# Patient Record
Sex: MALE | Race: WHITE | NOT HISPANIC OR LATINO
[De-identification: names, ages, dates, MRNs, and addresses within clinical notes are randomized per-mention and may not be internally consistent; named-entity substitution may affect disease eponyms.]

---

## 2019-06-11 ENCOUNTER — APPOINTMENT (OUTPATIENT)
Dept: INTERNAL MEDICINE | Facility: CLINIC | Age: 79
End: 2019-06-11
Payer: MEDICARE

## 2019-06-11 VITALS — RESPIRATION RATE: 14 BRPM | SYSTOLIC BLOOD PRESSURE: 100 MMHG | HEART RATE: 80 BPM | DIASTOLIC BLOOD PRESSURE: 54 MMHG

## 2019-06-11 VITALS — WEIGHT: 120 LBS

## 2019-06-11 DIAGNOSIS — E78.00 PURE HYPERCHOLESTEROLEMIA, UNSPECIFIED: ICD-10-CM

## 2019-06-11 DIAGNOSIS — N18.9 CHRONIC KIDNEY DISEASE, UNSPECIFIED: ICD-10-CM

## 2019-06-11 DIAGNOSIS — Z95.810 PRESENCE OF AUTOMATIC (IMPLANTABLE) CARDIAC DEFIBRILLATOR: ICD-10-CM

## 2019-06-11 DIAGNOSIS — K29.90 GASTRODUODENITIS, UNSPECIFIED, W/OUT BLEEDING: ICD-10-CM

## 2019-06-11 DIAGNOSIS — Z87.891 PERSONAL HISTORY OF NICOTINE DEPENDENCE: ICD-10-CM

## 2019-06-11 DIAGNOSIS — D63.1 CHRONIC KIDNEY DISEASE, UNSPECIFIED: ICD-10-CM

## 2019-06-11 PROCEDURE — 99204 OFFICE O/P NEW MOD 45 MIN: CPT | Mod: 25

## 2019-06-11 PROCEDURE — 36415 COLL VENOUS BLD VENIPUNCTURE: CPT

## 2019-06-11 RX ORDER — IPRATROPIUM BROMIDE AND ALBUTEROL SULFATE .5; 3 MG/3ML; MG/3ML
2.5-0.5 SOLUTION RESPIRATORY (INHALATION) 4 TIMES DAILY
Refills: 0 | Status: ACTIVE | COMMUNITY

## 2019-06-11 RX ORDER — METFORMIN HYDROCHLORIDE 850 MG/1
850 TABLET, FILM COATED ORAL TWICE DAILY
Refills: 0 | Status: ACTIVE | COMMUNITY

## 2019-06-11 RX ORDER — SACUBITRIL AND VALSARTAN 49; 51 MG/1; MG/1
49-51 TABLET, FILM COATED ORAL TWICE DAILY
Refills: 0 | Status: ACTIVE | COMMUNITY

## 2019-06-11 RX ORDER — TORSEMIDE 20 MG/1
20 TABLET ORAL TWICE DAILY
Qty: 180 | Refills: 3 | Status: ACTIVE | COMMUNITY

## 2019-06-11 RX ORDER — ADHESIVE TAPE 3"X 2.3 YD
50 MCG TAPE, NON-MEDICATED TOPICAL
Qty: 90 | Refills: 3 | Status: ACTIVE | COMMUNITY

## 2019-06-11 RX ORDER — RIVAROXABAN 15 MG/1
15 TABLET, FILM COATED ORAL
Qty: 90 | Refills: 3 | Status: ACTIVE | COMMUNITY

## 2019-06-11 RX ORDER — SPIRONOLACTONE 25 MG/1
25 TABLET ORAL DAILY
Qty: 90 | Refills: 3 | Status: ACTIVE | COMMUNITY

## 2019-06-11 RX ORDER — PANTOPRAZOLE 40 MG/1
40 TABLET, DELAYED RELEASE ORAL TWICE DAILY
Refills: 0 | Status: ACTIVE | COMMUNITY

## 2019-06-11 RX ORDER — ATORVASTATIN CALCIUM 10 MG/1
10 TABLET, FILM COATED ORAL DAILY
Qty: 90 | Refills: 3 | Status: ACTIVE | COMMUNITY

## 2019-06-11 RX ORDER — METOPROLOL SUCCINATE 25 MG/1
25 TABLET, EXTENDED RELEASE ORAL DAILY
Qty: 90 | Refills: 3 | Status: ACTIVE | COMMUNITY

## 2019-06-11 RX ORDER — FUROSEMIDE 20 MG/1
20 TABLET ORAL 3 TIMES DAILY
Refills: 0 | Status: DISCONTINUED | COMMUNITY
End: 2019-06-11

## 2019-06-11 RX ORDER — AMLODIPINE BESYLATE 5 MG/1
5 TABLET ORAL DAILY
Qty: 90 | Refills: 3 | Status: ACTIVE | COMMUNITY
Start: 2019-06-11

## 2019-06-11 NOTE — REVIEW OF SYSTEMS
[Shortness Of Breath] : shortness of breath [Dyspnea on Exertion] : dyspnea on exertion [FreeTextEntry5] : see HPi [FreeTextEntry2] : see HPI [de-identified] : Had agitation last night at [facility] house

## 2019-06-11 NOTE — ASSESSMENT
[FreeTextEntry1] : ANt wall MI 1981.  Stopped smoking at that time.  Has been in [facility] as  x 3 years. recently and deteriorating. Son droove him home from [facility].  Has O2 and AICD. Hosp x 2 for HCF and effusion and had L thoracentesis with 1 liter removed.  On both Lasix and Torsemide.  Meds clarified. DC Lasix.  Cont Torsemide 20 bid.  Does not need both.  Needs labs.  \par COPD.  On 2 liters.  Sat is 95.  Pt gets tired off O2.

## 2019-06-11 NOTE — PHYSICAL EXAM
[Cachectic] : cachexia was observed [Supple] : supple [Clear to Auscultation] : lungs were clear to auscultation bilaterally [Normal Supraclavicular Nodes] : no supraclavicular lymphadenopathy [Normal Posterior Cervical Nodes] : no posterior cervical lymphadenopathy [Normal Anterior Cervical Nodes] : no anterior cervical lymphadenopathy [Normal Affect] : the affect was normal [Alert and Oriented x3] : oriented to person, place, and time [de-identified] : Seems thin but comfortable on O2 [de-identified] : Irreg [de-identified] : Poor BS [de-identified] : 1-2+ edema at ankles

## 2019-06-11 NOTE — HISTORY OF PRESENT ILLNESS
[Other: _____] : [unfilled] [FreeTextEntry1] : Mr Plata was last seen 6 yrs ago.  He has been living in [facility].  Recently hospitalized and disch 6-6-19.\par Hosp x 2 4-19 and 6-19.  had Left Thoracentesis for pleural effusion 4-19.  Removed 1 liter.  Went to rehab 1 wk.  1 mo later, back to hosp.

## 2019-06-12 ENCOUNTER — RX RENEWAL (OUTPATIENT)
Age: 79
End: 2019-06-12

## 2019-06-12 LAB
ALBUMIN SERPL ELPH-MCNC: 4.3 G/DL
ALP BLD-CCNC: 87 U/L
ALT SERPL-CCNC: 9 U/L
ANION GAP SERPL CALC-SCNC: 16 MMOL/L
AST SERPL-CCNC: 12 U/L
BASOPHILS # BLD AUTO: 0.02 K/UL
BASOPHILS NFR BLD AUTO: 0.3 %
BILIRUB SERPL-MCNC: 0.7 MG/DL
BUN SERPL-MCNC: 22 MG/DL
CALCIUM SERPL-MCNC: 9.6 MG/DL
CHLORIDE SERPL-SCNC: 93 MMOL/L
CHOLEST SERPL-MCNC: 149 MG/DL
CHOLEST/HDLC SERPL: 3.6 RATIO
CO2 SERPL-SCNC: 26 MMOL/L
CREAT SERPL-MCNC: 1.04 MG/DL
EOSINOPHIL # BLD AUTO: 0.16 K/UL
EOSINOPHIL NFR BLD AUTO: 2 %
ESTIMATED AVERAGE GLUCOSE: 123 MG/DL
GLUCOSE SERPL-MCNC: 89 MG/DL
HBA1C MFR BLD HPLC: 5.9 %
HCT VFR BLD CALC: 37.8 %
HDLC SERPL-MCNC: 41 MG/DL
HGB BLD-MCNC: 11.3 G/DL
IMM GRANULOCYTES NFR BLD AUTO: 1.3 %
IRON SATN MFR SERPL: 14 %
IRON SERPL-MCNC: 49 UG/DL
LDLC SERPL CALC-MCNC: 90 MG/DL
LYMPHOCYTES # BLD AUTO: 1.13 K/UL
LYMPHOCYTES NFR BLD AUTO: 14.2 %
MAN DIFF?: NORMAL
MCHC RBC-ENTMCNC: 26.1 PG
MCHC RBC-ENTMCNC: 29.9 GM/DL
MCV RBC AUTO: 87.3 FL
MONOCYTES # BLD AUTO: 0.81 K/UL
MONOCYTES NFR BLD AUTO: 10.2 %
NEUTROPHILS # BLD AUTO: 5.74 K/UL
NEUTROPHILS NFR BLD AUTO: 72 %
PLATELET # BLD AUTO: 282 K/UL
POTASSIUM SERPL-SCNC: 4.6 MMOL/L
PROT SERPL-MCNC: 7.1 G/DL
RBC # BLD: 4.33 M/UL
RBC # FLD: 17.4 %
SODIUM SERPL-SCNC: 135 MMOL/L
TIBC SERPL-MCNC: 344 UG/DL
TRIGL SERPL-MCNC: 91 MG/DL
TSH SERPL-ACNC: 5.3 UIU/ML
UIBC SERPL-MCNC: 295 UG/DL
WBC # FLD AUTO: 7.96 K/UL

## 2019-06-12 RX ORDER — FERROUS SULFATE 325(65) MG
325 (65 FE) TABLET ORAL DAILY
Qty: 30 | Refills: 5 | Status: DISCONTINUED | COMMUNITY
End: 2019-06-12

## 2019-06-13 LAB — 25(OH)D3 SERPL-MCNC: 38.6 NG/ML

## 2019-06-21 ENCOUNTER — APPOINTMENT (OUTPATIENT)
Dept: INTERNAL MEDICINE | Facility: CLINIC | Age: 79
End: 2019-06-21

## 2019-06-21 ENCOUNTER — APPOINTMENT (OUTPATIENT)
Dept: INTERNAL MEDICINE | Facility: CLINIC | Age: 79
End: 2019-06-21
Payer: MEDICARE

## 2019-06-21 ENCOUNTER — INPATIENT (INPATIENT)
Facility: HOSPITAL | Age: 79
LOS: 3 days | Discharge: HOME | End: 2019-06-25
Attending: INTERNAL MEDICINE | Admitting: INTERNAL MEDICINE
Payer: MEDICARE

## 2019-06-21 VITALS
HEART RATE: 87 BPM | SYSTOLIC BLOOD PRESSURE: 147 MMHG | TEMPERATURE: 98 F | RESPIRATION RATE: 18 BRPM | WEIGHT: 128.09 LBS | HEIGHT: 63 IN | DIASTOLIC BLOOD PRESSURE: 67 MMHG | OXYGEN SATURATION: 99 %

## 2019-06-21 DIAGNOSIS — I11.9 HYPERTENSIVE HEART DISEASE W/OUT HEART FAILURE: ICD-10-CM

## 2019-06-21 DIAGNOSIS — F41.9 ANXIETY DISORDER, UNSPECIFIED: ICD-10-CM

## 2019-06-21 DIAGNOSIS — E11.9 TYPE 2 DIABETES MELLITUS WITHOUT COMPLICATIONS: ICD-10-CM

## 2019-06-21 DIAGNOSIS — J96.11 CHRONIC RESPIRATORY FAILURE WITH HYPOXIA: ICD-10-CM

## 2019-06-21 DIAGNOSIS — J44.9 CHRONIC OBSTRUCTIVE PULMONARY DISEASE, UNSPECIFIED: ICD-10-CM

## 2019-06-21 DIAGNOSIS — I50.23 ACUTE ON CHRONIC SYSTOLIC (CONGESTIVE) HEART FAILURE: ICD-10-CM

## 2019-06-21 DIAGNOSIS — E13.01 OTHER SPECIFIED DIABETES MELLITUS WITH HYPEROSMOLARITY WITH COMA: ICD-10-CM

## 2019-06-21 DIAGNOSIS — Z95.1 PRESENCE OF AORTOCORONARY BYPASS GRAFT: Chronic | ICD-10-CM

## 2019-06-21 DIAGNOSIS — Z95.0 PRESENCE OF CARDIAC PACEMAKER: Chronic | ICD-10-CM

## 2019-06-21 DIAGNOSIS — J96.11 CHRONIC RESPIRATORY FAILURE WITH HYPOXIA: Chronic | ICD-10-CM

## 2019-06-21 DIAGNOSIS — I10 ESSENTIAL (PRIMARY) HYPERTENSION: ICD-10-CM

## 2019-06-21 DIAGNOSIS — I48.2 CHRONIC ATRIAL FIBRILLATION: ICD-10-CM

## 2019-06-21 DIAGNOSIS — I25.810 ATHEROSCLEROSIS OF CORONARY ARTERY BYPASS GRAFT(S) WITHOUT ANGINA PECTORIS: ICD-10-CM

## 2019-06-21 DIAGNOSIS — Z29.9 ENCOUNTER FOR PROPHYLACTIC MEASURES, UNSPECIFIED: ICD-10-CM

## 2019-06-21 DIAGNOSIS — I50.42 CHRONIC COMBINED SYSTOLIC (CONGESTIVE) AND DIASTOLIC (CONGESTIVE) HEART FAILURE: ICD-10-CM

## 2019-06-21 LAB
ALBUMIN SERPL ELPH-MCNC: 3.6 G/DL — SIGNIFICANT CHANGE UP (ref 3.3–5)
ALP SERPL-CCNC: 110 U/L — SIGNIFICANT CHANGE UP (ref 40–120)
ALT FLD-CCNC: 16 U/L — SIGNIFICANT CHANGE UP (ref 12–78)
AMPHET UR-MCNC: NEGATIVE — SIGNIFICANT CHANGE UP
ANION GAP SERPL CALC-SCNC: 9 MMOL/L — SIGNIFICANT CHANGE UP (ref 5–17)
APAP SERPL-MCNC: < 2 UG/ML (ref 10–30)
AST SERPL-CCNC: 19 U/L — SIGNIFICANT CHANGE UP (ref 15–37)
BARBITURATES UR SCN-MCNC: NEGATIVE — SIGNIFICANT CHANGE UP
BASOPHILS # BLD AUTO: 0.05 K/UL — SIGNIFICANT CHANGE UP (ref 0–0.2)
BASOPHILS NFR BLD AUTO: 0.8 % — SIGNIFICANT CHANGE UP (ref 0–2)
BENZODIAZ UR-MCNC: NEGATIVE — SIGNIFICANT CHANGE UP
BILIRUB SERPL-MCNC: 1 MG/DL — SIGNIFICANT CHANGE UP (ref 0.2–1.2)
BUN SERPL-MCNC: 20 MG/DL — SIGNIFICANT CHANGE UP (ref 7–23)
CALCIUM SERPL-MCNC: 8.8 MG/DL — SIGNIFICANT CHANGE UP (ref 8.5–10.1)
CHLORIDE SERPL-SCNC: 102 MMOL/L — SIGNIFICANT CHANGE UP (ref 96–108)
CK MB BLD-MCNC: 3.4 % — SIGNIFICANT CHANGE UP (ref 0–3.5)
CK MB CFR SERPL CALC: 1 NG/ML — SIGNIFICANT CHANGE UP (ref 0.5–3.6)
CK SERPL-CCNC: 29 U/L — SIGNIFICANT CHANGE UP (ref 26–308)
CO2 SERPL-SCNC: 25 MMOL/L — SIGNIFICANT CHANGE UP (ref 22–31)
COCAINE METAB.OTHER UR-MCNC: NEGATIVE — SIGNIFICANT CHANGE UP
CREAT SERPL-MCNC: 1.18 MG/DL — SIGNIFICANT CHANGE UP (ref 0.5–1.3)
EOSINOPHIL # BLD AUTO: 0.2 K/UL — SIGNIFICANT CHANGE UP (ref 0–0.5)
EOSINOPHIL NFR BLD AUTO: 3.2 % — SIGNIFICANT CHANGE UP (ref 0–6)
ETHANOL SERPL-MCNC: <10 MG/DL — SIGNIFICANT CHANGE UP (ref 0–10)
GLUCOSE BLDC GLUCOMTR-MCNC: 228 MG/DL — HIGH (ref 70–99)
GLUCOSE SERPL-MCNC: 198 MG/DL — HIGH (ref 70–99)
HCT VFR BLD CALC: 39 % — SIGNIFICANT CHANGE UP (ref 39–50)
HGB BLD-MCNC: 12 G/DL — LOW (ref 13–17)
IMM GRANULOCYTES NFR BLD AUTO: 0.8 % — SIGNIFICANT CHANGE UP (ref 0–1.5)
LYMPHOCYTES # BLD AUTO: 0.85 K/UL — LOW (ref 1–3.3)
LYMPHOCYTES # BLD AUTO: 13.8 % — SIGNIFICANT CHANGE UP (ref 13–44)
MAGNESIUM SERPL-MCNC: 2.1 MG/DL — SIGNIFICANT CHANGE UP (ref 1.6–2.6)
MCHC RBC-ENTMCNC: 25.9 PG — LOW (ref 27–34)
MCHC RBC-ENTMCNC: 30.8 GM/DL — LOW (ref 32–36)
MCV RBC AUTO: 84.1 FL — SIGNIFICANT CHANGE UP (ref 80–100)
METHADONE UR-MCNC: NEGATIVE — SIGNIFICANT CHANGE UP
MONOCYTES # BLD AUTO: 0.59 K/UL — SIGNIFICANT CHANGE UP (ref 0–0.9)
MONOCYTES NFR BLD AUTO: 9.6 % — SIGNIFICANT CHANGE UP (ref 2–14)
NEUTROPHILS # BLD AUTO: 4.43 K/UL — SIGNIFICANT CHANGE UP (ref 1.8–7.4)
NEUTROPHILS NFR BLD AUTO: 71.8 % — SIGNIFICANT CHANGE UP (ref 43–77)
NRBC # BLD: 0 /100 WBCS — SIGNIFICANT CHANGE UP (ref 0–0)
NT-PROBNP SERPL-SCNC: 861 PG/ML — HIGH (ref 0–450)
OPIATES UR-MCNC: NEGATIVE — SIGNIFICANT CHANGE UP
PCP SPEC-MCNC: SIGNIFICANT CHANGE UP
PCP UR-MCNC: NEGATIVE — SIGNIFICANT CHANGE UP
PHOSPHATE SERPL-MCNC: 3.5 MG/DL — SIGNIFICANT CHANGE UP (ref 2.5–4.5)
PLATELET # BLD AUTO: 240 K/UL — SIGNIFICANT CHANGE UP (ref 150–400)
POTASSIUM SERPL-MCNC: 4.6 MMOL/L — SIGNIFICANT CHANGE UP (ref 3.5–5.3)
POTASSIUM SERPL-SCNC: 4.6 MMOL/L — SIGNIFICANT CHANGE UP (ref 3.5–5.3)
PROT SERPL-MCNC: 8 GM/DL — SIGNIFICANT CHANGE UP (ref 6–8.3)
RBC # BLD: 4.64 M/UL — SIGNIFICANT CHANGE UP (ref 4.2–5.8)
RBC # FLD: 17.2 % — HIGH (ref 10.3–14.5)
SALICYLATES SERPL-MCNC: <1.7 MG/DL — LOW (ref 2.8–20)
SODIUM SERPL-SCNC: 136 MMOL/L — SIGNIFICANT CHANGE UP (ref 135–145)
THC UR QL: NEGATIVE — SIGNIFICANT CHANGE UP
TROPONIN I SERPL-MCNC: 0.02 NG/ML — SIGNIFICANT CHANGE UP (ref 0.01–0.04)
WBC # BLD: 6.17 K/UL — SIGNIFICANT CHANGE UP (ref 3.8–10.5)
WBC # FLD AUTO: 6.17 K/UL — SIGNIFICANT CHANGE UP (ref 3.8–10.5)

## 2019-06-21 PROCEDURE — 99214 OFFICE O/P EST MOD 30 MIN: CPT

## 2019-06-21 PROCEDURE — 99223 1ST HOSP IP/OBS HIGH 75: CPT

## 2019-06-21 PROCEDURE — 93010 ELECTROCARDIOGRAM REPORT: CPT

## 2019-06-21 PROCEDURE — 71045 X-RAY EXAM CHEST 1 VIEW: CPT | Mod: 26

## 2019-06-21 PROCEDURE — 99285 EMERGENCY DEPT VISIT HI MDM: CPT

## 2019-06-21 RX ORDER — AMLODIPINE BESYLATE 2.5 MG/1
5 TABLET ORAL DAILY
Refills: 0 | Status: DISCONTINUED | OUTPATIENT
Start: 2019-06-21 | End: 2019-06-22

## 2019-06-21 RX ORDER — ATORVASTATIN CALCIUM 80 MG/1
1 TABLET, FILM COATED ORAL
Qty: 0 | Refills: 0 | DISCHARGE

## 2019-06-21 RX ORDER — FUROSEMIDE 40 MG
1 TABLET ORAL
Qty: 0 | Refills: 0 | DISCHARGE

## 2019-06-21 RX ORDER — DEXTROSE 50 % IN WATER 50 %
25 SYRINGE (ML) INTRAVENOUS ONCE
Refills: 0 | Status: DISCONTINUED | OUTPATIENT
Start: 2019-06-21 | End: 2019-06-25

## 2019-06-21 RX ORDER — DEXTROSE 50 % IN WATER 50 %
12.5 SYRINGE (ML) INTRAVENOUS ONCE
Refills: 0 | Status: DISCONTINUED | OUTPATIENT
Start: 2019-06-21 | End: 2019-06-25

## 2019-06-21 RX ORDER — FUROSEMIDE 40 MG
20 TABLET ORAL DAILY
Refills: 0 | Status: DISCONTINUED | OUTPATIENT
Start: 2019-06-21 | End: 2019-06-22

## 2019-06-21 RX ORDER — SACUBITRIL AND VALSARTAN 24; 26 MG/1; MG/1
1 TABLET, FILM COATED ORAL
Refills: 0 | Status: DISCONTINUED | OUTPATIENT
Start: 2019-06-21 | End: 2019-06-25

## 2019-06-21 RX ORDER — FUROSEMIDE 40 MG
40 TABLET ORAL ONCE
Refills: 0 | Status: COMPLETED | OUTPATIENT
Start: 2019-06-21 | End: 2019-06-21

## 2019-06-21 RX ORDER — ACETAMINOPHEN 500 MG
650 TABLET ORAL EVERY 6 HOURS
Refills: 0 | Status: DISCONTINUED | OUTPATIENT
Start: 2019-06-21 | End: 2019-06-25

## 2019-06-21 RX ORDER — PANTOPRAZOLE SODIUM 20 MG/1
1 TABLET, DELAYED RELEASE ORAL
Qty: 0 | Refills: 0 | DISCHARGE

## 2019-06-21 RX ORDER — ATORVASTATIN CALCIUM 80 MG/1
10 TABLET, FILM COATED ORAL DAILY
Refills: 0 | Status: DISCONTINUED | OUTPATIENT
Start: 2019-06-21 | End: 2019-06-21

## 2019-06-21 RX ORDER — SACUBITRIL AND VALSARTAN 24; 26 MG/1; MG/1
1 TABLET, FILM COATED ORAL
Qty: 0 | Refills: 0 | DISCHARGE

## 2019-06-21 RX ORDER — LANOLIN ALCOHOL/MO/W.PET/CERES
3 CREAM (GRAM) TOPICAL ONCE
Refills: 0 | Status: COMPLETED | OUTPATIENT
Start: 2019-06-21 | End: 2019-06-21

## 2019-06-21 RX ORDER — ATORVASTATIN CALCIUM 80 MG/1
10 TABLET, FILM COATED ORAL AT BEDTIME
Refills: 0 | Status: DISCONTINUED | OUTPATIENT
Start: 2019-06-21 | End: 2019-06-25

## 2019-06-21 RX ORDER — DEXTROSE 50 % IN WATER 50 %
15 SYRINGE (ML) INTRAVENOUS ONCE
Refills: 0 | Status: DISCONTINUED | OUTPATIENT
Start: 2019-06-21 | End: 2019-06-25

## 2019-06-21 RX ORDER — TICAGRELOR 90 MG/1
90 TABLET ORAL EVERY 12 HOURS
Refills: 0 | Status: DISCONTINUED | OUTPATIENT
Start: 2019-06-21 | End: 2019-06-25

## 2019-06-21 RX ORDER — PANTOPRAZOLE SODIUM 20 MG/1
40 TABLET, DELAYED RELEASE ORAL
Refills: 0 | Status: DISCONTINUED | OUTPATIENT
Start: 2019-06-21 | End: 2019-06-25

## 2019-06-21 RX ORDER — ACETAMINOPHEN 500 MG
650 TABLET ORAL ONCE
Refills: 0 | Status: COMPLETED | OUTPATIENT
Start: 2019-06-21 | End: 2019-06-21

## 2019-06-21 RX ORDER — RIVAROXABAN 15 MG-20MG
15 KIT ORAL
Refills: 0 | Status: DISCONTINUED | OUTPATIENT
Start: 2019-06-21 | End: 2019-06-25

## 2019-06-21 RX ORDER — ERGOCALCIFEROL 1.25 MG/1
1 CAPSULE ORAL
Qty: 0 | Refills: 0 | DISCHARGE

## 2019-06-21 RX ORDER — GLUCAGON INJECTION, SOLUTION 0.5 MG/.1ML
1 INJECTION, SOLUTION SUBCUTANEOUS ONCE
Refills: 0 | Status: DISCONTINUED | OUTPATIENT
Start: 2019-06-21 | End: 2019-06-25

## 2019-06-21 RX ORDER — METFORMIN HYDROCHLORIDE 850 MG/1
1 TABLET ORAL
Qty: 0 | Refills: 0 | DISCHARGE

## 2019-06-21 RX ORDER — METOPROLOL TARTRATE 50 MG
25 TABLET ORAL DAILY
Refills: 0 | Status: DISCONTINUED | OUTPATIENT
Start: 2019-06-21 | End: 2019-06-22

## 2019-06-21 RX ORDER — METFORMIN HYDROCHLORIDE 850 MG/1
500 TABLET ORAL
Refills: 0 | Status: DISCONTINUED | OUTPATIENT
Start: 2019-06-21 | End: 2019-06-25

## 2019-06-21 RX ORDER — ONDANSETRON 8 MG/1
4 TABLET, FILM COATED ORAL EVERY 6 HOURS
Refills: 0 | Status: DISCONTINUED | OUTPATIENT
Start: 2019-06-21 | End: 2019-06-25

## 2019-06-21 RX ORDER — TICAGRELOR 90 MG/1
1 TABLET ORAL
Qty: 0 | Refills: 0 | DISCHARGE

## 2019-06-21 RX ORDER — RIVAROXABAN 15 MG-20MG
1 KIT ORAL
Qty: 0 | Refills: 0 | DISCHARGE

## 2019-06-21 RX ORDER — HEPARIN SODIUM 5000 [USP'U]/ML
5000 INJECTION INTRAVENOUS; SUBCUTANEOUS EVERY 12 HOURS
Refills: 0 | Status: DISCONTINUED | OUTPATIENT
Start: 2019-06-21 | End: 2019-06-21

## 2019-06-21 RX ORDER — SODIUM CHLORIDE 9 MG/ML
1000 INJECTION, SOLUTION INTRAVENOUS
Refills: 0 | Status: DISCONTINUED | OUTPATIENT
Start: 2019-06-21 | End: 2019-06-25

## 2019-06-21 RX ADMIN — Medication 20 MILLIGRAM(S): at 18:07

## 2019-06-21 RX ADMIN — Medication 650 MILLIGRAM(S): at 11:41

## 2019-06-21 RX ADMIN — Medication 25 MILLIGRAM(S): at 18:07

## 2019-06-21 RX ADMIN — RIVAROXABAN 15 MILLIGRAM(S): KIT at 18:07

## 2019-06-21 RX ADMIN — TICAGRELOR 90 MILLIGRAM(S): 90 TABLET ORAL at 18:07

## 2019-06-21 RX ADMIN — SACUBITRIL AND VALSARTAN 1 TABLET(S): 24; 26 TABLET, FILM COATED ORAL at 18:07

## 2019-06-21 RX ADMIN — Medication 3 MILLIGRAM(S): at 22:45

## 2019-06-21 RX ADMIN — METFORMIN HYDROCHLORIDE 500 MILLIGRAM(S): 850 TABLET ORAL at 18:07

## 2019-06-21 RX ADMIN — Medication 40 MILLIGRAM(S): at 11:36

## 2019-06-21 RX ADMIN — ATORVASTATIN CALCIUM 10 MILLIGRAM(S): 80 TABLET, FILM COATED ORAL at 21:55

## 2019-06-21 NOTE — H&P ADULT - NSHPREVIEWOFSYSTEMS_GEN_ALL_CORE
REVIEW OF SYSTEMS:    CONSTITUTIONAL: No fever, NO generalized weakness/Fatigue, No weight loss  EYES: No eye pain, visual disturbances, or discharge  ENMT:  No difficulty hearing, tinnitus, vertigo; No sinus or throat pain  NECK: No pain or stiffness  RESPIRATORY: positive SALEH, no cough, wheezing, sputum or hemoptysis   CARDIOVASCULAR: positive leg swelling, No chest pain, palpitations  GASTROINTESTINAL: No abdominal pain. No nausea, vomiting, diarrhea or constipation. No melena or hematochezia.  GENITOURINARY: No dysuria, frequency, hematuria, or incontinence  SKIN: No itching, burning, rashes, or lesions   MUSCULOSKELETAL: No joint pain or swelling; No muscle, back, or extremity pain  HEME/LYMPH: No easy bruising, or bleeding gums  NEUROLOGICAL: No headaches, Dizziness, memory loss, loss of strength, numbness, or tremors  PSYCHIATRIC: No depression, anxiety, mood swings, or difficulty sleeping

## 2019-06-21 NOTE — ED PROVIDER NOTE - CLINICAL SUMMARY MEDICAL DECISION MAKING FREE TEXT BOX
pt with mild to moderate respiratory distress secondary to CHF likely on xray noted pleural edema bilaterally - will give lasix in ED - admit for further cardiac care.

## 2019-06-21 NOTE — ED PROVIDER NOTE - CONSTITUTIONAL, MLM
normal... Well appearing, well nourished, awake, alert, oriented to person, place, time/situation and mild respiratory distress noted RR 30

## 2019-06-21 NOTE — H&P ADULT - PROBLEM SELECTOR PLAN 1
Acute on Chronic Respiratory failure, acute exacerbation resolved w/ diuresis  secondary to acute CHF

## 2019-06-21 NOTE — HISTORY OF PRESENT ILLNESS
[FreeTextEntry1] : Patient came peacefully and became agitated when he learned he was late for his 8:30 appt and had to wait a few minutes while the 9 AM patient was being seen in his slot.  \par Pt lowered himself to the floor without any head injury.  Behavior is inappropriate.  EMS called.  \par No LOC noted, only rapid breathing and agitation.  No focal weakness or change in speech. Calmer once I was able to attend to him and more calm once EMS arrived.  \par Patient remained with eyes closed during 4 technician EMS eval.

## 2019-06-21 NOTE — H&P ADULT - NSHPPHYSICALEXAM_GEN_ALL_CORE
GENERAL: NAD, Thin, on Nasal canula    HEAD:  Atraumatic, Normocephalic  EYES: conjunctiva and sclera clear  ENMT: Moist mucous membranes, Good dentition, No lesions  NECK: Supple, No JVD, Normal thyroid  NERVOUS SYSTEM:  Alert & Oriented X3, Good concentration; Motor Strength 5/5 B/L upper and lower extremities; DTRs 2+ intact and symmetric  CHEST/LUNG: Clear to ascultation bilaterally; No rales, rhonchi, wheezing, or rubs  HEART: Regular rate and rhythm; No murmurs, rubs, or gallops  ABDOMEN: Soft, Nontender, Nondistended; Bowel sounds present  EXTREMITIES:  2+ Peripheral Pulses, No clubbing, cyanosis, or edema  LYMPH: No lymphadenopathy   SKIN: No rashes or lesions

## 2019-06-21 NOTE — H&P ADULT - NSHPLABSRESULTS_GEN_ALL_CORE
12.0   6.17  )-----------( 240      ( 21 Jun 2019 10:12 )             39.0     06-21    136  |  102  |  20  ----------------------------<  198<H>  4.6   |  25  |  1.18    Ca    8.8      21 Jun 2019 10:12  Phos  3.5     06-21  Mg     2.1     06-21    TPro  8.0  /  Alb  3.6  /  TBili  1.0  /  DBili  x   /  AST  19  /  ALT  16  /  AlkPhos  110  06-21    Xray Chest 1 View-PORTABLE IMMEDIATE (06.21.19 @ 10:49) >    FINDINGS/  IMPRESSION:There are bilateral diffuse patchy airspace opacities which   may represent pulmonary edema or multifocal pneumonia. The patient is   status poststernotomy and CABG. There is a dual-lead left-sided cardiac   pacing device in place.    EKG: SR @ 85 bpm, ?Bigeminy

## 2019-06-21 NOTE — ED ADULT TRIAGE NOTE - CHIEF COMPLAINT QUOTE
as per ems " pt from cardiologist office and complaining of sob, anxiety, and nausea." hx 3 bypass, dm, afib, chf. Pt educated on COPD action plan, teach back method utilized. Written materials  and action plan provided.

## 2019-06-21 NOTE — H&P ADULT - PROBLEM SELECTOR PLAN 2
CXR: bilateral congestion, BNP: 861, LE edema POA   Pt appears Euvolemic after ER IV Lasix 40  will continue with home dose for now  f/u Cardiology consult   f/u Echocardiogram

## 2019-06-21 NOTE — ASSESSMENT
[FreeTextEntry1] : Acute sudden agitation upon arrival and removed by EMS.\par No PE done.  \par Aware off O2 and now on Brilinta and Eliquis per 2 day stay at [facility] where he required Risperdal x 1 dose. \par Cecy daughter aware that this is primarily a psychiatric problem and she hopes that in the hospital he will have a psych MD eval and treatment.  Going now to [facility].

## 2019-06-21 NOTE — ED ADULT NURSE NOTE - OBJECTIVE STATEMENT
received pt to bed 10 alert anxious complaining of nausea and shortness of breath. sent from cardiology office to be evaluated

## 2019-06-21 NOTE — H&P ADULT - HISTORY OF PRESENT ILLNESS
79 y/o M with PMH of HTN, HLD, DM II, CAD s/p CABG, CHF s/p AICD, and Chronic respiratory Failure (home 2L O2) presents to ER with Shortness of breath and Anxiety that started yesterday. The patient is visiting from [place] and states that he has been hospitalized several time in the past 4 months for this Shortness of breath including an ICU course where he was intubated. He describes Dyspnea with mild activity and swelling in his legs when he woke up this morning. He admits to feeling nausea, feeling anxious, denies CP, palpitations or dizziness. He denies fever, cough, vomiting, abdominal pain, diarrhea or dysuria.       ER course: CXR: bilateral opacities, BNP: 861, trop: .020, EKG: SR@85bpm, ?bigeminy, s/p IV Lasix 40mg

## 2019-06-21 NOTE — CONSULT NOTE ADULT - SUBJECTIVE AND OBJECTIVE BOX
CHIEF COMPLAINT:  Patient is a 78y old  Male who presents with a chief complaint of Shortness of breath, Anxiety (21 Jun 2019 14:45)      HPI:  77 y/o M with HTN, HLD, DM II, CAD s/p CABG, CHF s/p AICD, and Chronic respiratory Failure (home 2L O2) presents to ER with Shortness of breath and Anxiety that started yesterday. The patient is visiting from [place] and states that he has been hospitalized several time in the past 4 months for this Shortness of breath including an ICU course where he was intubated. He describes Dyspnea with mild activity and swelling in his legs when he woke up this morning. He admits to feeling nausea, feeling anxious, denies CP, palpitations or dizziness. He denies fever, cough, vomiting, abdominal pain, diarrhea or dysuria.   CXR: bilateral opacities, BNP: 861, trop: .020, EKG: SR@85bpm, ?bigeminy, s/p IV Lasix 40mg. Feeling better.    ALLERGIES:  No Known Allergies    Home Medications:  amLODIPine 5 mg oral tablet: 1 tab(s) orally once a day (21 Jun 2019 16:28)  atorvastatin 10 mg oral tablet: 1 tab(s) orally once a day (21 Jun 2019 16:28)  Entresto 49 mg-51 mg oral tablet: 1 tab(s) orally 2 times a day (21 Jun 2019 16:28)  furosemide 20 mg oral tablet: 1 tab(s) orally once a day (21 Jun 2019 16:28)  metFORMIN 500 mg oral tablet: 1 tab(s) orally 2 times a day (21 Jun 2019 16:28)  metoprolol succinate 25 mg oral tablet, extended release: 1 tab(s) orally once a day (21 Jun 2019 16:28)  Protonix 40 mg oral delayed release tablet: 1 tab(s) orally once a day (21 Jun 2019 16:28)  rivaroxaban 15 mg oral tablet: 1 tab(s) orally once a day (in the evening) (21 Jun 2019 16:28)  ticagrelor 90 mg oral tablet: 1 tab(s) orally 2 times a day (21 Jun 2019 16:28)  Vitamin D2 2000 intl units oral capsule: 1 cap(s) orally once a day (21 Jun 2019 16:28)      PAST MEDICAL & SURGICAL HISTORY:  Hyperlipidemia  Chronic hypoxemic respiratory failure  CAD (coronary atherosclerotic disease)  HTN (hypertension)  CHF (congestive heart failure)  Diabetes  S/P CABG x 3  Artificial pacemaker      FAMILY HISTORY:  FH: heart disease      SOCIAL HISTORY:  non-smoker    REVIEW OF SYSTEMS:  General:  No wt loss, fevers, chills, night sweats  Eyes:  Good vision, no reported pain  ENT:  No sore throat, pain, runny nose, dysphagia  CV:  No pain, palpitations, hypo/hypertension  Resp:  No dyspnea, cough, tachypnea, wheezing  GI:  No pain, nausea, vomiting, diarrhea, constipation  :  No pain, bleeding, incontinence, nocturia  Muscle:  No pain, weakness  Breast:  No pain, abscess, mass, discharge  Neuro:  No weakness, tingling, memory problems  Psych:  No fatigue, insomnia, mood problems, depression  Endocrine:  No polyuria, polydipsia, cold/heat intolerance  Heme:  No petechiae, ecchymosis, easy bruisability  Skin:  No rash, edema    PHYSICAL EXAM:  Vital Signs:  Vital Signs Last 24 Hrs  T(C): 37 (21 Jun 2019 15:56), Max: 37 (21 Jun 2019 15:56)  T(F): 98.6 (21 Jun 2019 15:56), Max: 98.6 (21 Jun 2019 15:56)  HR: 86 (21 Jun 2019 15:56) (81 - 88)  BP: 124/68 (21 Jun 2019 15:56) (113/68 - 147/67)  RR: 18 (21 Jun 2019 15:56) (18 - 32)  SpO2: 98% (21 Jun 2019 15:56) (97% - 100%)  I&O's Summary      General:  Appears stated age, well-groomed, well-nourished, no distress  HEENT:  NC/AT, patent nares w/ pink mucosa, OP clear w/o lesions, PERRL, EOMI, conjunctivae clear, no thyromegaly, nodules, adenopathy, no JVD  Chest:  Full & symmetric excursion, no increased effort, breath sounds clear  Cardiovascular:  Regular rhythm, S1, S2, no murmur  Abdomen:  Soft, non-tender, non-distended, normoactive bowel sounds  Extremities:  no edema  Skin:  No rash/erythema/ecchymoses/petechiae/wounds/abscess/warm/dry  Musculoskeletal:  Full ROM in all joints w/o swelling/tenderness/effusion  Neuro/Psych:  Alert, oriented    LABORATORY:                          12.0   6.17  )-----------( 240      ( 21 Jun 2019 10:12 )             39.0     06-21    136  |  102  |  20  ----------------------------<  198<H>  4.6   |  25  |  1.18    Ca    8.8      21 Jun 2019 10:12  Phos  3.5     06-21  Mg     2.1     06-21    TPro  8.0  /  Alb  3.6  /  TBili  1.0  /  DBili  x   /  AST  19  /  ALT  16  /  AlkPhos  110  06-21      CARDIAC MARKERS ( 21 Jun 2019 10:12 )  .020 ng/mL / x     / 29 U/L / x     / 1.0 ng/mL      LIVER FUNCTIONS - ( 21 Jun 2019 10:12 )  Alb: 3.6 g/dL / Pro: 8.0 gm/dL / ALK PHOS: 110 U/L / ALT: 16 U/L / AST: 19 U/L / GGT: x               BNPSerum Pro-Brain Natriuretic Peptide: 861 pg/mL (06-21-19 @ 10:12)      IMAGING:  < from: 12 Lead ECG (06.21.19 @ 10:23) >  Normal sinus rhythm  premature ectopic complexes  Nonspecific T wave abnormality  Abnormal ECG  No previous ECGs available    < end of copied text >    < from: Xray Chest 1 View-PORTABLE IMMEDIATE (06.21.19 @ 10:49) >  IMPRESSION:There are bilateral diffuse patchy airspace opacities which   may represent pulmonary edema or multifocal pneumonia. The patient is   status poststernotomy and CABG. There is a dual-lead left-sided cardiac   pacing device in place.    < end of copied text >    ASSESSMENT:  77 y/o M with HTN, HLD, DM II, CAD s/p CABG, CHF s/p AICD, and Chronic respiratory Failure (home 2L O2) presents to ER with Shortness of breath and Anxiety that started yesterday. The patient is visiting from [place] and states that he has been hospitalized several time in the past 4 months for this Shortness of breath including an ICU course where he was intubated. He describes Dyspnea with mild activity and swelling in his legs when he woke up this morning. He admits to feeling nausea, feeling anxious, denies CP, palpitations or dizziness. He denies fever, cough, vomiting, abdominal pain, diarrhea or dysuria.   CXR: bilateral opacities, BNP: 861, trop: .020, EKG: SR@85bpm, ?bigeminy, s/p IV Lasix 40mg. Feeling better.    PLAN:     MEDICATIONS  (STANDING):  amLODIPine   Tablet 5 milliGRAM(s) Oral daily  atorvastatin 10 milliGRAM(s) Oral at bedtime  furosemide    Tablet 20 milliGRAM(s) Oral daily  metFORMIN 500 milliGRAM(s) Oral two times a day  metoprolol succinate ER 25 milliGRAM(s) Oral daily  pantoprazole    Tablet 40 milliGRAM(s) Oral before breakfast  rivaroxaban 15 milliGRAM(s) Oral with dinner  sacubitril 49 mG/valsartan 51 mG 1 Tablet(s) Oral two times a day  ticagrelor 90 milliGRAM(s) Oral every 12 hours    fluid restrict. daily wt  echo pending.    Malcolm Ewing MD, FACC, FASE, RAHATNC, FACP  Director, Heart Failure Services  [facility]  , Department of Cardiology  [facility]

## 2019-06-21 NOTE — ED PROVIDER NOTE - OBJECTIVE STATEMENT
78 year old male with PMH of CAD with CABG x 3 V, DM II, HLD, HTN, otherwise presenting to ED due to SOB from PMD office - pt also has been acting up at home as per daughter which has been more frequent since March of this year when he was in ICU in [place]. Pt otherwise complaining of SOB, no chest pain.

## 2019-06-21 NOTE — ED ADULT NURSE NOTE - NSFALLRSKOUTCOME_ED_ALL_ED
"Subjective:   Deo Beard is a 52 y.o. male with know h/o  1. CAD-nuclear stress test from 2014 showed Mixed findings of fixed decreased perfusion and a reversible stress induced ischemia seen in inferolateral distribution.  Never had angiogram  2. Ischemic cardiomyopathy  3. History of morbid obesity status post gastric bypass surgery lost approximately 150 lb  4. Sleep apnea on BiPAP  5. Hypertension  6. Dyslipidemia  Presenting today for follow-up evaluation of CAD.    Patient on an average walks about one and half miles a day. He does have stable angina for past 2 years. Denied any complaints of palpitations orthopnea or PND. No complaints of dizziness lightheadedness or LOC. Denied any complaints of lower extremity edema or claudication.    Past Medical History:   Diagnosis Date   • Abnormal EKG 12/27/2012   • Abnormal EKG 12/27/2012   • Bronchitis    • CAD (coronary artery disease) 2008   • Depression    • Diabetes ([facility])    • HTN (hypertension)    • Hyperlipidemia    • Hypoxia 12/27/2012   • Ischemic cardiomyopathy    • Leukocytosis (leucocytosis) 12/27/2012   • Medically noncompliant 7/18/2013   • MI (myocardial infarction)     Silent MI , ( old), noted on EKG in 2008   • Migraine    • Personal history of venous thrombosis and embolism     leg \"years ago\"   • Pneumonia     5 months ago   • Polysubstance abuse 7/18/2013   • [place] spotted fever     June 2017   • Sleep apnea, obstructive     CPAP 2010   • Substance abuse     2007; Meth   • Tobacco abuse 7/18/2013     Past Surgical History:   Procedure Laterality Date   • GASTRIC BANDING LAPAROSCOPIC  2015   • PB INCIS/DRAIN FOREARM DEEP ABSCESS  1996    left AC due to IV drug abuse   • WRIST ORIF      left wrist     Family History   Problem Relation Age of Onset   • No Known Problems Mother    • Heart Disease Father      CHF   • Stroke Father    • Heart Attack Father    • Cancer Brother      Colon Cancer   • Arthritis Maternal Grandmother  " "  • Arthritis Maternal Grandfather    • Arthritis Paternal Grandmother    • Cancer Paternal Grandfather    • Heart Disease Paternal Grandfather      History   Smoking Status   • Former Smoker   • Packs/day: 2.50   • Years: 20.00   • Types: Cigarettes   • Quit date: 9/26/2012   Smokeless Tobacco   • Never Used     No Known Allergies  Outpatient Encounter Prescriptions as of 1/17/2018   Medication Sig Dispense Refill   • fluticasone (FLONASE) 50 MCG/ACT nasal spray Spray 1-2 Sprays in nose 2 times a day as needed. 1 Bottle 3   • lisinopril (PRINIVIL) 10 MG Tab Take 0.5 Tabs by mouth every day. 90 Tab 3   • nitroglycerin (NITROSTAT) 0.4 MG SL Tab Place 1 Tab under tongue as needed for Chest Pain. 25 Tab 0   • duloxetine (CYMBALTA) 60 MG Cap DR Particles delayed-release capsule Take 1 Cap by mouth every day. 90 Cap 0   • pravastatin (PRAVACHOL) 20 MG Tab Take 1 Tab by mouth every day. 90 Tab 0   • ibuprofen (MOTRIN) 600 MG Tab Take 1 Tab by mouth every 6 hours as needed. 30 Tab 0     No facility-administered encounter medications on file as of 1/17/2018.      Review of Systems   Constitutional: Negative for chills and fever.   HENT: Positive for congestion. Negative for hearing loss and tinnitus.         Head ache    Eyes: Negative for blurred vision and double vision.   Respiratory: Negative for shortness of breath.    Cardiovascular: Positive for chest pain. Negative for palpitations and leg swelling.        Chest tightness   Gastrointestinal: Positive for diarrhea and nausea. Negative for abdominal pain, blood in stool and melena.   Genitourinary: Negative for dysuria and hematuria.   Musculoskeletal: Positive for joint pain and myalgias.   Skin: Negative for rash.   Neurological: Positive for headaches. Negative for dizziness and loss of consciousness.   Psychiatric/Behavioral: Positive for depression. The patient is not nervous/anxious.         Objective:   /62   Pulse 68   Ht 1.727 m (5' 8\")   Wt 112.5 kg " (248 lb)   SpO2 93%   BMI 37.71 kg/m²     Physical Exam   Constitutional: He is oriented to person, place, and time. He appears well-developed and well-nourished. No distress.   HENT:   Head: Normocephalic and atraumatic.   Mouth/Throat: No oropharyngeal exudate.   Eyes: Pupils are equal, round, and reactive to light. Right eye exhibits no discharge. Left eye exhibits no discharge.   Neck: No JVD (difficult to assess) present. No tracheal deviation present.   Cardiovascular: Normal rate and regular rhythm.    No murmur heard.  Pulmonary/Chest: Effort normal and breath sounds normal. No respiratory distress. He has no wheezes.   Abdominal: Soft. Bowel sounds are normal. He exhibits no distension. There is no tenderness.   Musculoskeletal: Normal range of motion. He exhibits no edema.   Neurological: He is alert and oriented to person, place, and time. No cranial nerve deficit.   Skin: Skin is warm and dry. He is not diaphoretic. No erythema.   Psychiatric: He has a normal mood and affect. His behavior is normal.   Results for SEBASTIÁN JANELLE KING (MRN [id number]) as of 2018 14:02   10/31/2017    Sodium 137   Potassium 3.9   Chloride 102   Co2 27   Anion Gap 8.0   Glucose 86   Bun 12   Creatinine 0.56   GFR If Non African American >60   Calcium 9.2   AST(SGOT) 15   ALT(SGPT) 7   Alkaline Phosphatase 83   Glycohemoglobin 5.2   Estim. Avg Glu 103   Cholesterol,Tot 160   Triglycerides 83   HDL 42    (H)       EK17  EKG personally reviewed by me.  Sinus rhythm right bundle branch block inferolateral Q waves    Nuclear stress test: 14  LVEF 40%. Mixed findings of fixed decreased perfusion and a reversible stress induced ischemia seen in inferolateral distribution.    Echo: 14   LVEF 50% mild MR trace TR  Assessment:     1. Hypertension  2. CAD  3. Ischemic cardiomyopathy  4. Dyslipidemia    Medical Decision Making:  Today's Assessment / Status / Plan:     1. Decrease lisinopril to 2.5 mg  daily.  2. Continue aspirin 81 mg daily  Start Toprol-XL 12.5 mg daily.  3. Echo and nuclear stress test prior to next visit.  4. Advised patient to increase pravastatin to 40 mg qhs, once he finishes pravastatin start Crestor 20 mg qHs to target LDL less than 70.    Follow-up in 4-6 weeks  Nuclear stress test echo prior to next visit.  Labs in 3 months.    Thank you for allowing me to participate in taking care of patient.    Edna Jones MD.   Universal Safety Interventions

## 2019-06-21 NOTE — H&P ADULT - NSICDXPASTMEDICALHX_GEN_ALL_CORE_FT
PAST MEDICAL HISTORY:  CAD (coronary atherosclerotic disease)     CHF (congestive heart failure)     Chronic hypoxemic respiratory failure     Diabetes     HTN (hypertension)     Hyperlipidemia

## 2019-06-21 NOTE — ED ADULT NURSE NOTE - NSIMPLEMENTINTERV_GEN_ALL_ED
Implemented All Universal Safety Interventions:  [place] to call system. Call bell, personal items and telephone within reach. Instruct patient to call for assistance. Room bathroom lighting operational. Non-slip footwear when patient is off stretcher. Physically safe environment: no spills, clutter or unnecessary equipment. Stretcher in lowest position, wheels locked, appropriate side rails in place.

## 2019-06-22 DIAGNOSIS — F06.4 ANXIETY DISORDER DUE TO KNOWN PHYSIOLOGICAL CONDITION: ICD-10-CM

## 2019-06-22 LAB
ANION GAP SERPL CALC-SCNC: 9 MMOL/L — SIGNIFICANT CHANGE UP (ref 5–17)
BUN SERPL-MCNC: 23 MG/DL — SIGNIFICANT CHANGE UP (ref 7–23)
CALCIUM SERPL-MCNC: 9.1 MG/DL — SIGNIFICANT CHANGE UP (ref 8.5–10.1)
CHLORIDE SERPL-SCNC: 98 MMOL/L — SIGNIFICANT CHANGE UP (ref 96–108)
CHOLEST SERPL-MCNC: 118 MG/DL — SIGNIFICANT CHANGE UP (ref 10–199)
CO2 SERPL-SCNC: 28 MMOL/L — SIGNIFICANT CHANGE UP (ref 22–31)
CREAT SERPL-MCNC: 1.09 MG/DL — SIGNIFICANT CHANGE UP (ref 0.5–1.3)
GLUCOSE BLDC GLUCOMTR-MCNC: 138 MG/DL — HIGH (ref 70–99)
GLUCOSE BLDC GLUCOMTR-MCNC: 142 MG/DL — HIGH (ref 70–99)
GLUCOSE BLDC GLUCOMTR-MCNC: 148 MG/DL — HIGH (ref 70–99)
GLUCOSE BLDC GLUCOMTR-MCNC: 148 MG/DL — HIGH (ref 70–99)
GLUCOSE SERPL-MCNC: 103 MG/DL — HIGH (ref 70–99)
HBA1C BLD-MCNC: 6.1 % — HIGH (ref 4–5.6)
HCT VFR BLD CALC: 36.8 % — LOW (ref 39–50)
HDLC SERPL-MCNC: 35 MG/DL — LOW
HGB BLD-MCNC: 11.6 G/DL — LOW (ref 13–17)
LIPID PNL WITH DIRECT LDL SERPL: 67 MG/DL — SIGNIFICANT CHANGE UP
MCHC RBC-ENTMCNC: 26.4 PG — LOW (ref 27–34)
MCHC RBC-ENTMCNC: 31.5 GM/DL — LOW (ref 32–36)
MCV RBC AUTO: 83.6 FL — SIGNIFICANT CHANGE UP (ref 80–100)
NRBC # BLD: 0 /100 WBCS — SIGNIFICANT CHANGE UP (ref 0–0)
PLATELET # BLD AUTO: 244 K/UL — SIGNIFICANT CHANGE UP (ref 150–400)
POTASSIUM SERPL-MCNC: 4.3 MMOL/L — SIGNIFICANT CHANGE UP (ref 3.5–5.3)
POTASSIUM SERPL-SCNC: 4.3 MMOL/L — SIGNIFICANT CHANGE UP (ref 3.5–5.3)
RBC # BLD: 4.4 M/UL — SIGNIFICANT CHANGE UP (ref 4.2–5.8)
RBC # FLD: 17.3 % — HIGH (ref 10.3–14.5)
SODIUM SERPL-SCNC: 135 MMOL/L — SIGNIFICANT CHANGE UP (ref 135–145)
TOTAL CHOLESTEROL/HDL RATIO MEASUREMENT: 3.4 RATIO — SIGNIFICANT CHANGE UP (ref 3.4–9.6)
TRIGL SERPL-MCNC: 80 MG/DL — SIGNIFICANT CHANGE UP (ref 10–149)
TSH SERPL-MCNC: 5.39 UIU/ML — HIGH (ref 0.36–3.74)
WBC # BLD: 7.07 K/UL — SIGNIFICANT CHANGE UP (ref 3.8–10.5)
WBC # FLD AUTO: 7.07 K/UL — SIGNIFICANT CHANGE UP (ref 3.8–10.5)

## 2019-06-22 PROCEDURE — 99233 SBSQ HOSP IP/OBS HIGH 50: CPT

## 2019-06-22 PROCEDURE — 90792 PSYCH DIAG EVAL W/MED SRVCS: CPT

## 2019-06-22 RX ORDER — METOPROLOL TARTRATE 50 MG
25 TABLET ORAL DAILY
Refills: 0 | Status: DISCONTINUED | OUTPATIENT
Start: 2019-06-22 | End: 2019-06-22

## 2019-06-22 RX ORDER — LANOLIN ALCOHOL/MO/W.PET/CERES
3 CREAM (GRAM) TOPICAL AT BEDTIME
Refills: 0 | Status: DISCONTINUED | OUTPATIENT
Start: 2019-06-22 | End: 2019-06-25

## 2019-06-22 RX ORDER — ALPRAZOLAM 0.25 MG
0.25 TABLET ORAL
Refills: 0 | Status: DISCONTINUED | OUTPATIENT
Start: 2019-06-22 | End: 2019-06-25

## 2019-06-22 RX ORDER — SODIUM CHLORIDE 9 MG/ML
500 INJECTION INTRAMUSCULAR; INTRAVENOUS; SUBCUTANEOUS ONCE
Refills: 0 | Status: COMPLETED | OUTPATIENT
Start: 2019-06-22 | End: 2019-06-22

## 2019-06-22 RX ADMIN — TICAGRELOR 90 MILLIGRAM(S): 90 TABLET ORAL at 17:41

## 2019-06-22 RX ADMIN — Medication 650 MILLIGRAM(S): at 09:54

## 2019-06-22 RX ADMIN — METFORMIN HYDROCHLORIDE 500 MILLIGRAM(S): 850 TABLET ORAL at 05:45

## 2019-06-22 RX ADMIN — Medication 650 MILLIGRAM(S): at 08:30

## 2019-06-22 RX ADMIN — TICAGRELOR 90 MILLIGRAM(S): 90 TABLET ORAL at 05:45

## 2019-06-22 RX ADMIN — SODIUM CHLORIDE 500 MILLILITER(S): 9 INJECTION INTRAMUSCULAR; INTRAVENOUS; SUBCUTANEOUS at 12:34

## 2019-06-22 RX ADMIN — METFORMIN HYDROCHLORIDE 500 MILLIGRAM(S): 850 TABLET ORAL at 17:41

## 2019-06-22 RX ADMIN — SACUBITRIL AND VALSARTAN 1 TABLET(S): 24; 26 TABLET, FILM COATED ORAL at 17:41

## 2019-06-22 RX ADMIN — RIVAROXABAN 15 MILLIGRAM(S): KIT at 17:41

## 2019-06-22 RX ADMIN — SACUBITRIL AND VALSARTAN 1 TABLET(S): 24; 26 TABLET, FILM COATED ORAL at 05:44

## 2019-06-22 RX ADMIN — Medication 25 MILLIGRAM(S): at 05:45

## 2019-06-22 RX ADMIN — ATORVASTATIN CALCIUM 10 MILLIGRAM(S): 80 TABLET, FILM COATED ORAL at 22:13

## 2019-06-22 RX ADMIN — Medication 3 MILLIGRAM(S): at 23:10

## 2019-06-22 RX ADMIN — Medication 100 MILLIGRAM(S): at 07:00

## 2019-06-22 RX ADMIN — PANTOPRAZOLE SODIUM 40 MILLIGRAM(S): 20 TABLET, DELAYED RELEASE ORAL at 05:45

## 2019-06-22 NOTE — PROGRESS NOTE ADULT - SUBJECTIVE AND OBJECTIVE BOX
CHIEF COMPLAINT:  Patient is a 78y old  Male who presents with a chief complaint of Shortness of breath, Anxiety (21 Jun 2019 14:45)      HPI:  77 y/o M with HTN, HLD, DM II, CAD s/p CABG, HFrEF s/p ICD, and Chronic respiratory Failure (home 2L O2) presents to ER with Shortness of breath and Anxiety. The patient is visiting from [place] and states that he has been hospitalized several time in the past 4 months for this Shortness of breath including an ICU course where he was intubated. He describes Dyspnea with mild activity and swelling in his legs when he woke up this morning. He admits to feeling nausea, feeling anxious, denies CP, palpitations or dizziness. He denies fever, cough, vomiting, abdominal pain, diarrhea or dysuria.   CXR: bilateral opacities, BNP: 861, trop: .020, EKG: SR@85bpm, ?bigeminy, s/p IV Lasix 40mg. Improving. However, pt's dtr is concerned for behavioral disturbances lately. Had mild hypotension today. Responded to IVFs.      REVIEW OF SYSTEMS:  General:  No wt loss, fevers, chills, night sweats  Eyes:  Good vision, no reported pain  ENT:  No sore throat, pain, runny nose, dysphagia  CV:  No pain, palpitations, hypo/hypertension  Resp:  No dyspnea, cough, tachypnea, wheezing  GI:  No pain, nausea, vomiting, diarrhea, constipation  :  No pain, bleeding, incontinence, nocturia  Muscle:  No pain, weakness  Breast:  No pain, abscess, mass, discharge  Neuro:  No weakness, tingling, memory problems  Psych:  No fatigue, insomnia, mood problems, depression  Endocrine:  No polyuria, polydipsia, cold/heat intolerance  Heme:  No petechiae, ecchymosis, easy bruisability  Skin:  No rash, edema    PHYSICAL EXAM:  Vital Signs:  Vital Signs Last 24 Hrs  Vital Signs Last 24 Hrs  T(C): 36.2 (22 Jun 2019 13:45), Max: 36.4 (21 Jun 2019 23:39)  T(F): 97.2 (22 Jun 2019 13:45), Max: 97.6 (22 Jun 2019 11:43)  HR: 83 (22 Jun 2019 17:40) (71 - 91)  BP: 129/53 (22 Jun 2019 17:40) (71/35 - 129/53)  RR: 18 (22 Jun 2019 13:45) (18 - 18)  SpO2: 100% (22 Jun 2019 13:45) (95% - 100%)    Tele: SR, vent bigeminy at times.  General:  Appears stated age, well-groomed, well-nourished, no distress  HEENT:  NC/AT, patent nares w/ pink mucosa, OP clear w/o lesions, PERRL, EOMI, conjunctivae clear, no thyromegaly, nodules, adenopathy, no JVD  Chest:  Full & symmetric excursion, no increased effort, breath sounds clear  Cardiovascular:  Regular rhythm, S1, S2, no murmur  Abdomen:  Soft, non-tender, non-distended, normoactive bowel sounds  Extremities:  no edema  Skin:  No rash/erythema/ecchymoses/petechiae/wounds/abscess/warm/dry  Musculoskeletal:  Full ROM in all joints w/o swelling/tenderness/effusion  Neuro/Psych:  Alert, oriented    LABORATORY:                                     11.6   7.07  )-----------( 244      ( 22 Jun 2019 06:30 )             36.8     06-22    135  |  98  |  23  ----------------------------<  103<H>  4.3   |  28  |  1.09    Ca    9.1      22 Jun 2019 06:30      IMAGING:  < from: 12 Lead ECG (06.21.19 @ 10:23) >  Normal sinus rhythm  premature ectopic complexes  Nonspecific T wave abnormality  Abnormal ECG  No previous ECGs available    < end of copied text >    < from: Xray Chest 1 View-PORTABLE IMMEDIATE (06.21.19 @ 10:49) >  IMPRESSION:There are bilateral diffuse patchy airspace opacities which   may represent pulmonary edema or multifocal pneumonia. The patient is   status poststernotomy and CABG. There is a dual-lead left-sided cardiac   pacing device in place.    < end of copied text >    ASSESSMENT:  77 y/o M with HTN, HLD, DM II, CAD s/p CABG, HFrEF s/p ICD, and Chronic respiratory Failure (home 2L O2) presents to ER with Shortness of breath and Anxiety. The patient is visiting from [place] and states that he has been hospitalized several time in the past 4 months for this Shortness of breath including an ICU course where he was intubated. He describes Dyspnea with mild activity and swelling in his legs when he woke up this morning. He admits to feeling nausea, feeling anxious, denies CP, palpitations or dizziness. He denies fever, cough, vomiting, abdominal pain, diarrhea or dysuria.   CXR: bilateral opacities, BNP: 861, trop: .020, EKG: SR@85bpm, ?bigeminy, s/p IV Lasix 40mg. Improving. However, pt's dtr is concerned for behavioral disturbances lately. Had mild hypotension today. Responded to IVFs.    PLAN:     Tele monitoring.    MEDICATIONS  (STANDING):  atorvastatin 10 milliGRAM(s) Oral at bedtime  metFORMIN 500 milliGRAM(s) Oral two times a day  pantoprazole    Tablet 40 milliGRAM(s) Oral before breakfast  rivaroxaban 15 milliGRAM(s) Oral with dinner  sacubitril 49 mG/valsartan 51 mG 1 Tablet(s) Oral two times a day  ticagrelor 90 milliGRAM(s) Oral every 12 hours    watch BP and care with fluid and sodium restrict.  echo ordered.  d/w pt's dtr.    Malcolm Ewing MD, FACC, MAG, RAHATNC, FACP  Director, Heart Failure Services  [facility]  , Department of Cardiology  [employer]

## 2019-06-22 NOTE — BEHAVIORAL HEALTH ASSESSMENT NOTE - RISK ASSESSMENT
Chronic risk factors: male gender; age > 76 yrs; chronic medical conditions impacting quality fo life with recent repeated hospitalizations including intubation. Protective factors: medication and treatment compliant; no history of psych hospitalizations, no hx of formal psych diagnosis or treatment; no suicide attempts; no self-injurious behavior; no drug or substance use; stable housing; stable finances; no hx of aggression/violence; no legal issues; motivated for help; articulate; strong family support

## 2019-06-22 NOTE — BEHAVIORAL HEALTH ASSESSMENT NOTE - HPI (INCLUDE ILLNESS QUALITY, SEVERITY, DURATION, TIMING, CONTEXT, MODIFYING FACTORS, ASSOCIATED SIGNS AND SYMPTOMS)
79yo male, domiciled in [place] and in [place] visiting family, with no formal psychiatric history, BIB EMS from his Cardiologist's office for SOB, anxiety, and nausea and admitted with Acute on chronic systolic congestive heart failure and Chronic hypoxemic respiratory failure. As per family, Patient had behavioral changes and cognitive decline since his hospital discharge in March '19 during which time he was in ICU and was intubated; Pt had multiple medical admissions for SOB within the last 4 months in [place].    ISTOP checked; no record of Patient 77yo  male, domiciled in [place], [place] and in [place] visiting family, noncaregiver, with no formal psychiatric history, baseline high functioning, performs independent ADLs, socializes, travels, when able goes for daily walks or bike rides, BIB EMS from his Cardiologist's office for SOB, anxiety, and nausea and admitted with Acute on chronic systolic congestive heart failure and Chronic hypoxemic respiratory failure. As per family, Patient had behavioral changes and cognitive decline since his hospital discharge in March '19 during which time he was in ICU and was intubated; Pt had multiple medical admissions for SOB within the last 4 months in [place].    ISTOP checked; no record of Patient    Patient is calm, cooperative, polite, pleasant and fully engages. Patient states that he felt like "running away: and like he was "jumping out of his skin" yesterday in the ED because he felt like he was drowning and could not breath. He felt that his complaint was not addressed fast/efficiently enough. Patient says he feels much better today and denies feeling anxious or restless (he also does not exhibit any signs of it). Patient says that he had multiple medical admissions this year and noted that he has not felt like himself totally after his last ICU admission when he was intubated and describes having to think about words more before finding it, maintaining attention with more effort, more easily forgetting things. Patient however continues to function as usual and denies feeling distressed/sad/hopeless. He says his children are over-protective about him and he has a very strong family support. Patient lives both in [place] (has cardiac follow up at [facility] where he had his first cardiac procedure > 15 yrs ago: and also doctors in [place] including multiple specialists). patient at this time endorses stable euthymic mood, at home regular sleep / appetite. (does not sleep well in hospital beds but sleeps well at home). Denies any symptoms of hypomania/roman/psychosis/major depression/ anxiety/panic. Denies any active or passive suicidal or homicidal ideation. Names protective factors (mariaa; family; hope for future). Endorses medication compliance. Denies adverse medication side effects. Denies substance / drug use.

## 2019-06-22 NOTE — BEHAVIORAL HEALTH ASSESSMENT NOTE - NSBHCONSULTFOLLOWAFTERCARE_PSY_A_CORE FT
if cognitive/behavioral changes continue, can see an outpatient geriatric psychiatrist or neurologist for monitoring

## 2019-06-22 NOTE — BEHAVIORAL HEALTH ASSESSMENT NOTE - SUMMARY
- low dose benzos can be helpful in physiologically medicated anxiousness; Xanax 0.5mg PO q6hrs PRN  - unable to test cognition with high degree of certainty in a patient who is in an acute state of physical distress/acute medical issue  - suspecting change in behavior/memory was triggered y most recent ICU stay, intubation and can be a prolonged delirium which is not uncommon in this age group. It can take months to totally clear; it can also never clear and making the new behaviors/cognitive status the new baseline  - once back home in [place], follow up with a Neurologist or a Geriatric Psychiatrist for monitoring - low dose benzos can be helpful in physiologically medicated anxiousness; Xanax 0.25mg - 0.5mg PO BID PRN  - unable to test cognition with high degree of certainty in a patient who is in an acute / residual state of physical distress/acute medical issue. Even though Patient is better, he is not able to 100% participate in  rigorous cognitive testing. He is able to do this once he recuperates with an outpatient Neurologist or a Geriatric Psychiatrist  - suspecting change in behavior/memory was triggered y most recent ICU stay, intubation and can be a prolonged delirium which is not uncommon in this age group. It can take months to totally clear; it can also never clear and making the new behaviors/cognitive status the new baseline. (discussed this with Patient and provided psychoeducation)  - cleared for discharge

## 2019-06-22 NOTE — BEHAVIORAL HEALTH ASSESSMENT NOTE - NSBHCHARTREVIEWIMAGING_PSY_A_CORE FT
chest xray: There are bilateral diffuse patchy airspace opacities which may represent pulmonary edema or multifocal pneumonia. The patient is   status poststernotomy and CABG. There is a dual-lead left-sided cardiac pacing device in place.

## 2019-06-22 NOTE — BEHAVIORAL HEALTH ASSESSMENT NOTE - DESCRIPTION
CHF s/p AICD, Chronic respiratory Failure (home 2L O2); CAD with CABG x 3 V, DM II, HLD, HTN, intubated and in ICU 3/2019

## 2019-06-22 NOTE — PHYSICAL THERAPY INITIAL EVALUATION ADULT - TINETTI GAIT TEST, REHAB EVAL
Indication of gait -1/1,   Step Length and height -2/2,   Foot Clearance -2/2,   Step Symmetry - 1/1,  Step Continuity -1/1,   Path -1/ 2,   Trunk -2/2,   Walking Time -1/1,   Total Score - 11/12

## 2019-06-22 NOTE — PROGRESS NOTE ADULT - SUBJECTIVE AND OBJECTIVE BOX
Patient is a 78y old  Male who presents with a chief complaint of Shortness of breath, Anxiety (21 Jun 2019 19:10)      INTERVAL HPI/ OVERNIGHT EVENTS: Pt was seen and examined at bedside today, No significant overnight events, pt denies any chest pain or SOB. Daughter at the bedside, care plan discussed.     MEDICATIONS  (STANDING):  atorvastatin 10 milliGRAM(s) Oral at bedtime  dextrose 5%. 1000 milliLiter(s) (50 mL/Hr) IV Continuous <Continuous>  dextrose 50% Injectable 12.5 Gram(s) IV Push once  dextrose 50% Injectable 25 Gram(s) IV Push once  dextrose 50% Injectable 25 Gram(s) IV Push once  metFORMIN 500 milliGRAM(s) Oral two times a day  pantoprazole    Tablet 40 milliGRAM(s) Oral before breakfast  rivaroxaban 15 milliGRAM(s) Oral with dinner  sacubitril 49 mG/valsartan 51 mG 1 Tablet(s) Oral two times a day  ticagrelor 90 milliGRAM(s) Oral every 12 hours    MEDICATIONS  (PRN):  acetaminophen   Tablet .. 650 milliGRAM(s) Oral every 6 hours PRN Mild Pain (1 - 3)  ALPRAZolam 0.25 milliGRAM(s) Oral two times a day PRN Anxiety  dextrose 40% Gel 15 Gram(s) Oral once PRN Blood Glucose LESS THAN 70 milliGRAM(s)/deciliter  glucagon  Injectable 1 milliGRAM(s) IntraMuscular once PRN Glucose LESS THAN 70 milligrams/deciliter  ondansetron Injectable 4 milliGRAM(s) IV Push every 6 hours PRN Nausea and/or Vomiting      Allergies    No Known Allergies    Intolerances        REVIEW OF SYSTEMS:    Unable to examine due to [ ] Encephalopathy [ ] Advanced Dementia [ ] Expressive Aphasia [ ] Non-verbal patient    CONSTITUTIONAL: No fever, NO generalized weakness/Fatigue, No weight loss  EYES: No eye pain, visual disturbances, or discharge  ENMT:  No difficulty hearing, tinnitus, vertigo; No sinus or throat pain  NECK: No pain or stiffness  RESPIRATORY: No shortness of breath,  cough, wheezing, sputum or hemoptysis   CARDIOVASCULAR: No chest pain, palpitations, or leg swelling  GASTROINTESTINAL: No abdominal pain. No nausea, vomiting, diarrhea or constipation. No melena or hematochezia.  GENITOURINARY: No dysuria, frequency, hematuria, or incontinence  NEUROLOGICAL: No headaches, Dizziness, memory loss, loss of strength, numbness, or tremors  SKIN: No itching, burning, rashes, or lesions   MUSCULOSKELETAL: No joint pain or swelling; No muscle, back, or extremity pain  PSYCHIATRIC: No depression, anxiety, mood swings, or difficulty sleeping  HEME/LYMPH: No easy bruising, or bleeding gums      Vital Signs Last 24 Hrs  T(C): 36.2 (22 Jun 2019 13:45), Max: 36.4 (21 Jun 2019 23:39)  T(F): 97.2 (22 Jun 2019 13:45), Max: 97.6 (22 Jun 2019 11:43)  HR: 83 (22 Jun 2019 17:40) (71 - 91)  BP: 129/53 (22 Jun 2019 17:40) (71/35 - 129/53)  BP(mean): --  RR: 18 (22 Jun 2019 13:45) (18 - 18)  SpO2: 100% (22 Jun 2019 13:45) (95% - 100%)    PHYSICAL EXAM:  GENERAL: NAD on nasal canula, thin, well-groomed  HEAD:  Atraumatic, Normocephalic  EYES: conjunctiva and sclera clear  ENMT: Moist mucous membranes  NECK: Supple, No JVD, Normal thyroid  CHEST/LUNG: Clear to Auscultation bilaterally; No rales, rhonchi, wheezing, or rubs  HEART: Regular rate and rhythm; No murmurs, rubs, or gallops  ABDOMEN: Soft, Nontender, Nondistended; Bowel sounds present  EXTREMITIES:  2+ Peripheral Pulses, No clubbing, cyanosis, or edema  SKIN: No rashes or lesions  NERVOUS SYSTEM:  Alert & Oriented X3, Good concentration; Motor Strength 5/5 B/L upper and lower extremities    LABS:                        11.6   7.07  )-----------( 244      ( 22 Jun 2019 06:30 )             36.8     06-22    135  |  98  |  23  ----------------------------<  103<H>  4.3   |  28  |  1.09    Ca    9.1      22 Jun 2019 06:30  Phos  3.5     06-21  Mg     2.1     06-21    TPro  8.0  /  Alb  3.6  /  TBili  1.0  /  DBili  x   /  AST  19  /  ALT  16  /  AlkPhos  110  06-21        CAPILLARY BLOOD GLUCOSE      POCT Blood Glucose.: 142 mg/dL (22 Jun 2019 11:44)  POCT Blood Glucose.: 138 mg/dL (22 Jun 2019 08:26)  POCT Blood Glucose.: 228 mg/dL (21 Jun 2019 21:53)          RADIOLOGY & ADDITIONAL TESTS:          Imaging Personally Reviewed:  [ ] YES  [ ] NO    Consultant(s) Notes Reviewed:  [ x] YES  [ ] NO    Care Discussed with Consultants/Other Providers [x ] YES  [ ] NO

## 2019-06-22 NOTE — PHYSICAL THERAPY INITIAL EVALUATION ADULT - GENERAL OBSERVATIONS, REHAB EVAL
Chart (EMR) reviewed. Received sitting at bedside chair, NAD. +O2 via nasal cannula and cardiac monitor donned. Alert. Ox4. Able to follow multistep commands/directions.

## 2019-06-22 NOTE — PHYSICAL THERAPY INITIAL EVALUATION ADULT - ACTIVE RANGE OF MOTION EXAMINATION, REHAB EVAL
bilateral lower extremity Active ROM was WNL (within normal limits)/adriel. upper extremity Active ROM was WNL (within normal limits)

## 2019-06-22 NOTE — PHYSICAL THERAPY INITIAL EVALUATION ADULT - ADDITIONAL COMMENTS
Patient lives alone in a pvt house c elevator, no steps to use. Independent c all ADL's and ambulation without device. Patient has own home O2.

## 2019-06-23 LAB
AMPHET UR-MCNC: NEGATIVE — SIGNIFICANT CHANGE UP
ANION GAP SERPL CALC-SCNC: 10 MMOL/L — SIGNIFICANT CHANGE UP (ref 5–17)
BARBITURATES, URINE.: NEGATIVE — SIGNIFICANT CHANGE UP
BENZODIAZ UR-MCNC: NEGATIVE — SIGNIFICANT CHANGE UP
BUN SERPL-MCNC: 30 MG/DL — HIGH (ref 7–23)
CALCIUM SERPL-MCNC: 8.4 MG/DL — LOW (ref 8.5–10.1)
CHLORIDE SERPL-SCNC: 102 MMOL/L — SIGNIFICANT CHANGE UP (ref 96–108)
CO2 SERPL-SCNC: 23 MMOL/L — SIGNIFICANT CHANGE UP (ref 22–31)
COCAINE METAB.OTHER UR-MCNC: NEGATIVE — SIGNIFICANT CHANGE UP
CREAT SERPL-MCNC: 1.31 MG/DL — HIGH (ref 0.5–1.3)
CREATININE, URINE THERAPEUTIC: 13.6 MG/DL — LOW
GLUCOSE BLDC GLUCOMTR-MCNC: 117 MG/DL — HIGH (ref 70–99)
GLUCOSE BLDC GLUCOMTR-MCNC: 127 MG/DL — HIGH (ref 70–99)
GLUCOSE BLDC GLUCOMTR-MCNC: 131 MG/DL — HIGH (ref 70–99)
GLUCOSE BLDC GLUCOMTR-MCNC: 149 MG/DL — HIGH (ref 70–99)
GLUCOSE SERPL-MCNC: 100 MG/DL — HIGH (ref 70–99)
HCT VFR BLD CALC: 35.8 % — LOW (ref 39–50)
HGB BLD-MCNC: 11 G/DL — LOW (ref 13–17)
MAGNESIUM SERPL-MCNC: 2 MG/DL — SIGNIFICANT CHANGE UP (ref 1.6–2.6)
MCHC RBC-ENTMCNC: 25.8 PG — LOW (ref 27–34)
MCHC RBC-ENTMCNC: 30.7 GM/DL — LOW (ref 32–36)
MCV RBC AUTO: 83.8 FL — SIGNIFICANT CHANGE UP (ref 80–100)
METHADONE UR-MCNC: NEGATIVE — SIGNIFICANT CHANGE UP
METHAQUALONE UR QL: NEGATIVE — SIGNIFICANT CHANGE UP
METHAQUALONE UR-MCNC: NEGATIVE — SIGNIFICANT CHANGE UP
NRBC # BLD: 0 /100 WBCS — SIGNIFICANT CHANGE UP (ref 0–0)
OPIATES UR-MCNC: NEGATIVE — SIGNIFICANT CHANGE UP
PCP UR-MCNC: NEGATIVE — SIGNIFICANT CHANGE UP
PHOSPHATE SERPL-MCNC: 3.8 MG/DL — SIGNIFICANT CHANGE UP (ref 2.5–4.5)
PLATELET # BLD AUTO: 232 K/UL — SIGNIFICANT CHANGE UP (ref 150–400)
POTASSIUM SERPL-MCNC: 4.8 MMOL/L — SIGNIFICANT CHANGE UP (ref 3.5–5.3)
POTASSIUM SERPL-SCNC: 4.8 MMOL/L — SIGNIFICANT CHANGE UP (ref 3.5–5.3)
PROPOXYPH UR QL: NEGATIVE — SIGNIFICANT CHANGE UP
RBC # BLD: 4.27 M/UL — SIGNIFICANT CHANGE UP (ref 4.2–5.8)
RBC # FLD: 17.5 % — HIGH (ref 10.3–14.5)
SODIUM SERPL-SCNC: 135 MMOL/L — SIGNIFICANT CHANGE UP (ref 135–145)
T3 SERPL-MCNC: 92 NG/DL — SIGNIFICANT CHANGE UP (ref 80–200)
T4 AB SER-ACNC: 6.8 UG/DL — SIGNIFICANT CHANGE UP (ref 4.6–12)
THC UR QL: NEGATIVE — SIGNIFICANT CHANGE UP
TSH SERPL-MCNC: 4.88 UU/ML — HIGH (ref 0.36–3.74)
WBC # BLD: 6.04 K/UL — SIGNIFICANT CHANGE UP (ref 3.8–10.5)
WBC # FLD AUTO: 6.04 K/UL — SIGNIFICANT CHANGE UP (ref 3.8–10.5)

## 2019-06-23 PROCEDURE — 99232 SBSQ HOSP IP/OBS MODERATE 35: CPT

## 2019-06-23 PROCEDURE — 99233 SBSQ HOSP IP/OBS HIGH 50: CPT

## 2019-06-23 PROCEDURE — 93306 TTE W/DOPPLER COMPLETE: CPT | Mod: 26

## 2019-06-23 RX ADMIN — SACUBITRIL AND VALSARTAN 1 TABLET(S): 24; 26 TABLET, FILM COATED ORAL at 17:48

## 2019-06-23 RX ADMIN — PANTOPRAZOLE SODIUM 40 MILLIGRAM(S): 20 TABLET, DELAYED RELEASE ORAL at 05:20

## 2019-06-23 RX ADMIN — Medication 0.25 MILLIGRAM(S): at 22:00

## 2019-06-23 RX ADMIN — ATORVASTATIN CALCIUM 10 MILLIGRAM(S): 80 TABLET, FILM COATED ORAL at 22:00

## 2019-06-23 RX ADMIN — Medication 650 MILLIGRAM(S): at 15:51

## 2019-06-23 RX ADMIN — Medication 3 MILLIGRAM(S): at 22:00

## 2019-06-23 RX ADMIN — TICAGRELOR 90 MILLIGRAM(S): 90 TABLET ORAL at 05:20

## 2019-06-23 RX ADMIN — METFORMIN HYDROCHLORIDE 500 MILLIGRAM(S): 850 TABLET ORAL at 05:19

## 2019-06-23 RX ADMIN — METFORMIN HYDROCHLORIDE 500 MILLIGRAM(S): 850 TABLET ORAL at 17:48

## 2019-06-23 RX ADMIN — SACUBITRIL AND VALSARTAN 1 TABLET(S): 24; 26 TABLET, FILM COATED ORAL at 05:20

## 2019-06-23 RX ADMIN — RIVAROXABAN 15 MILLIGRAM(S): KIT at 17:48

## 2019-06-23 RX ADMIN — Medication 650 MILLIGRAM(S): at 16:55

## 2019-06-23 RX ADMIN — TICAGRELOR 90 MILLIGRAM(S): 90 TABLET ORAL at 17:48

## 2019-06-23 NOTE — PROGRESS NOTE ADULT - PROBLEM SELECTOR PLAN 1
Acute on Chronic Respiratory failure, acute exacerbation resolved w/ diuresis  secondary to acute CHF
Acute on Chronic Respiratory failure, acute exacerbation resolved w/ diuresis  secondary to acute CHF

## 2019-06-23 NOTE — PROGRESS NOTE ADULT - PROBLEM SELECTOR PLAN 3
cont w/ Metformin   FS monitoring w/ insulin s/s coverage.
cont w/ Metformin   FS monitoring w/ insulin s/s coverage.

## 2019-06-23 NOTE — DIETITIAN INITIAL EVALUATION ADULT. - DIET TYPE
06/21/19/DASH/TLC (sodium and cholesterol restricted diet)/regular/consistent carbohydrate (no snacks)

## 2019-06-23 NOTE — DIETITIAN INITIAL EVALUATION ADULT. - PERTINENT LABORATORY DATA
06-23  Hgb 11.0 g/dL<L> Hct 35.8 %<L> 06-23 Na135 mmol/L Glu 100 mg/dL<H> K+ 4.8 mmol/L Cr  1.31 mg/dL<H> BUN 30 mg/dL<H> 06-23 Phos 3.8 mg/dL 06-21 Alb 3.6 g/dL 06-22 [id number] 6.1 %<H> 06-22 Chol 118 mg/dL LDL 67 mg/dL HDL 35 mg/dL<L> Trig 80 mg/dL06-21 ALT 16 U/L AST 19 U/L Alkaline Phosphatase 110 U/L

## 2019-06-23 NOTE — PROGRESS NOTE ADULT - PROBLEM SELECTOR PLAN 5
Psych consult appreciated   ?prolonged delirium vs. early dementia   Xanax prn anxiety
Psych consult appreciated   ?prolonged delirium vs. early dementia   Xanax prn anxiety

## 2019-06-23 NOTE — PROGRESS NOTE ADULT - PROBLEM SELECTOR PLAN 4
CAD s/p CABG  continue Ticagrelor, Xarelto and statin
CAD s/p CABG  continue Ticagrelor, Xarelto and statin

## 2019-06-23 NOTE — DIETITIAN INITIAL EVALUATION ADULT. - ETIOLOGY
inadequate protein-energy intake in setting of hx of multiple hospitalization, respiratory failure, cardiac hx, DM, altered swallow

## 2019-06-23 NOTE — PROGRESS NOTE ADULT - SUBJECTIVE AND OBJECTIVE BOX
Patient is a 78y old  Male who presents with a chief complaint of Shortness of breath, Anxiety (22 Jun 2019 18:19)      INTERVAL HPI/ OVERNIGHT EVENTS: Pt was seen and examined at bedside today, No significant overnight events, pt denies CP or SOB, blood pressure still low normal.      MEDICATIONS  (STANDING):  atorvastatin 10 milliGRAM(s) Oral at bedtime  dextrose 5%. 1000 milliLiter(s) (50 mL/Hr) IV Continuous <Continuous>  dextrose 50% Injectable 12.5 Gram(s) IV Push once  dextrose 50% Injectable 25 Gram(s) IV Push once  dextrose 50% Injectable 25 Gram(s) IV Push once  melatonin 3 milliGRAM(s) Oral at bedtime  metFORMIN 500 milliGRAM(s) Oral two times a day  pantoprazole    Tablet 40 milliGRAM(s) Oral before breakfast  rivaroxaban 15 milliGRAM(s) Oral with dinner  sacubitril 49 mG/valsartan 51 mG 1 Tablet(s) Oral two times a day  ticagrelor 90 milliGRAM(s) Oral every 12 hours    MEDICATIONS  (PRN):  acetaminophen   Tablet .. 650 milliGRAM(s) Oral every 6 hours PRN Mild Pain (1 - 3)  ALPRAZolam 0.25 milliGRAM(s) Oral two times a day PRN Anxiety  dextrose 40% Gel 15 Gram(s) Oral once PRN Blood Glucose LESS THAN 70 milliGRAM(s)/deciliter  glucagon  Injectable 1 milliGRAM(s) IntraMuscular once PRN Glucose LESS THAN 70 milligrams/deciliter  ondansetron Injectable 4 milliGRAM(s) IV Push every 6 hours PRN Nausea and/or Vomiting      Allergies    No Known Allergies    Intolerances        REVIEW OF SYSTEMS:    Unable to examine due to [ ] Encephalopathy [ ] Advanced Dementia [ ] Expressive Aphasia [ ] Non-verbal patient    CONSTITUTIONAL: No fever, NO generalized weakness/Fatigue, No weight loss  EYES: No eye pain, visual disturbances, or discharge  ENMT:  No difficulty hearing, tinnitus, vertigo; No sinus or throat pain  NECK: No pain or stiffness  RESPIRATORY: No shortness of breath,  cough, wheezing, sputum or hemoptysis   CARDIOVASCULAR: No chest pain, palpitations, or leg swelling  GASTROINTESTINAL: No abdominal pain. No nausea, vomiting, diarrhea or constipation. No melena or hematochezia.  GENITOURINARY: No dysuria, frequency, hematuria, or incontinence  NEUROLOGICAL: No headaches, Dizziness, memory loss, loss of strength, numbness, or tremors  SKIN: No itching, burning, rashes, or lesions   MUSCULOSKELETAL: No joint pain or swelling; No muscle, back, or extremity pain  PSYCHIATRIC: No depression, anxiety, mood swings, or difficulty sleeping  HEME/LYMPH: No easy bruising, or bleeding gums      Vital Signs Last 24 Hrs  T(C): 36.4 (23 Jun 2019 10:54), Max: 36.8 (22 Jun 2019 23:41)  T(F): 97.6 (23 Jun 2019 10:54), Max: 98.2 (22 Jun 2019 23:41)  HR: 86 (23 Jun 2019 10:54) (71 - 88)  BP: 96/50 (23 Jun 2019 10:54) (71/35 - 129/53)  BP(mean): --  RR: 18 (23 Jun 2019 10:54) (18 - 18)  SpO2: 100% (23 Jun 2019 10:54) (95% - 100%)    PHYSICAL EXAM:  GENERAL: NAD, thin/frail, well-groomed  HEAD:  Atraumatic, Normocephalic  EYES: conjunctiva and sclera clear  ENMT: Moist mucous membranes  NECK: Supple, No JVD, Normal thyroid  CHEST/LUNG: Clear to Auscultation bilaterally; No rales, rhonchi, wheezing, or rubs  HEART: Regular rate and rhythm; No murmurs, rubs, or gallops  ABDOMEN: Soft, Nontender, Nondistended; Bowel sounds present  EXTREMITIES:  2+ Peripheral Pulses, No clubbing, cyanosis, or edema  SKIN: No rashes or lesions  NERVOUS SYSTEM:  Alert & Oriented X3, Good concentration; Motor Strength 5/5 B/L upper and lower extremities    LABS:                        11.0   6.04  )-----------( 232      ( 23 Jun 2019 07:20 )             35.8     06-23    135  |  102  |  30<H>  ----------------------------<  100<H>  4.8   |  23  |  1.31<H>    Ca    8.4<L>      23 Jun 2019 07:20  Phos  3.8     06-23  Mg     2.0     06-23          CAPILLARY BLOOD GLUCOSE      POCT Blood Glucose.: 149 mg/dL (23 Jun 2019 11:42)  POCT Blood Glucose.: 127 mg/dL (23 Jun 2019 09:35)  POCT Blood Glucose.: 148 mg/dL (22 Jun 2019 22:12)  POCT Blood Glucose.: 148 mg/dL (22 Jun 2019 17:45)          RADIOLOGY & ADDITIONAL TESTS:          Imaging Personally Reviewed:  [ ] YES  [ ] NO    Consultant(s) Notes Reviewed:  [ ] YES  [ ] NO    Care Discussed with Consultants/Other Providers [x ] YES  [ ] NO

## 2019-06-23 NOTE — DIETITIAN INITIAL EVALUATION ADULT. - NS AS NUTRI INTERV MEALS SNACK
Texture-modified diet/Mineral - modified diet/Carbohydrate - modified diet/Recommend Dysphagia 3 Soft - Thin Liquids, Low sodium , consistent carbohydrate c evening snack

## 2019-06-23 NOTE — DIETITIAN INITIAL EVALUATION ADULT. - NUTRITION INTERVENTION
Meals and Snack/Nutrition Education Meals and Snack/Collaboration and Referral of Nutrition Care/Nutrition Education Collaboration and Referral of Nutrition Care/Meals and Snack/Medical Food Supplements/Nutrition Education

## 2019-06-23 NOTE — DIETITIAN INITIAL EVALUATION ADULT. - FACTORS AFF FOOD INTAKE
other (specify)/some difficulty swallowing reported, food goes down c intake of water, some chewing difficulty due to dental work reported, pt is tolerating soft foods/difficulty swallowing

## 2019-06-23 NOTE — PROGRESS NOTE ADULT - PROBLEM SELECTOR PROBLEM 4
Atherosclerosis of other coronary artery bypass graft without angina pectoris
Atherosclerosis of other coronary artery bypass graft without angina pectoris

## 2019-06-23 NOTE — DIETITIAN INITIAL EVALUATION ADULT. - OTHER INFO
Pt. was seen for dx of CHF. Pt reports that he has been following Low sodium diet since 1981, denies dietary indirections, weighs himself daily and self monitored blood glucose 2 x /day.   Pt reported that he wasn't feeling well/headaches c intake of metformin and blood glucose were in the 100-140's PTA.

## 2019-06-23 NOTE — PROGRESS NOTE ADULT - SUBJECTIVE AND OBJECTIVE BOX
CHIEF COMPLAINT:  Patient is a 78y old  Male who presents with a chief complaint of Shortness of breath, Anxiety (21 Jun 2019 14:45)      HPI:  77 y/o M with HTN, HLD, DM II, CAD s/p CABG, HFrEF s/p ICD, and Chronic respiratory Failure (home 2L O2) presents to ER with Shortness of breath and Anxiety. The patient is visiting from [place] and states that he has been hospitalized several time in the past 4 months for this Shortness of breath including an ICU course where he was intubated. He describes Dyspnea with mild activity and swelling in his legs when he woke up this morning. He admits to feeling nausea, feeling anxious, denies CP, palpitations or dizziness. He denies fever, cough, vomiting, abdominal pain, diarrhea or dysuria.   CXR: bilateral opacities, BNP: 861, trop: .020, EKG: SR@85bpm, ?bigeminy, s/p IV Lasix 40mg. Improving. However, pt's dtr is concerned for behavioral disturbances lately. Had mild hypotension today. Responded to IVFs. Feeling better today and breathing more comfortably laying flat. Concerns over behavior from pt's dtr and staff noted.      REVIEW OF SYSTEMS:  General:  No wt loss, fevers, chills, night sweats  Eyes:  Good vision, no reported pain  ENT:  No sore throat, pain, runny nose, dysphagia  CV:  No pain, palpitations, hypo/hypertension  Resp:  No dyspnea, cough, tachypnea, wheezing  GI:  No pain, nausea, vomiting, diarrhea, constipation  :  No pain, bleeding, incontinence, nocturia  Muscle:  No pain, weakness  Breast:  No pain, abscess, mass, discharge  Neuro:  No weakness, tingling, memory problems  Psych:  No fatigue, insomnia, mood problems, depression  Endocrine:  No polyuria, polydipsia, cold/heat intolerance  Heme:  No petechiae, ecchymosis, easy bruisability  Skin:  No rash, edema    PHYSICAL EXAM:  Vital Signs Last 24 Hrs  T(C): 36.3 (23 Jun 2019 16:33), Max: 36.8 (22 Jun 2019 23:41)  T(F): 97.4 (23 Jun 2019 16:33), Max: 98.2 (22 Jun 2019 23:41)  HR: 83 (23 Jun 2019 16:33) (80 - 88)  BP: 107/61 (23 Jun 2019 16:33) (96/50 - 129/53)  RR: 18 (23 Jun 2019 16:33) (18 - 18)  SpO2: 100% (23 Jun 2019 16:33) (95% - 100%)    Tele: SR, vent bigeminy at times.  General:  Appears stated age, well-groomed, well-nourished, no distress  HEENT:  NC/AT, patent nares w/ pink mucosa, OP clear w/o lesions, PERRL, EOMI, conjunctivae clear, no thyromegaly, nodules, adenopathy, no JVD  Chest:  Full & symmetric excursion, no increased effort, breath sounds clear  Cardiovascular:  Regular rhythm, S1, S2, no murmur  Abdomen:  Soft, non-tender, non-distended, normoactive bowel sounds  Extremities:  no edema  Skin: warm/dry  Musculoskeletal:  Full ROM in all joints w/o swelling/tenderness/effusion  Neuro/Psych:  Alert, oriented    LABORATORY:                                     11.0   6.04  )-----------( 232      ( 23 Jun 2019 07:20 )             35.8     06-23    135  |  102  |  30<H>  ----------------------------<  100<H>  4.8   |  23  |  1.31<H>    Ca    8.4<L>      23 Jun 2019 07:20  Phos  3.8     06-23  Mg     2.0     06-23          IMAGING:  < from: 12 Lead ECG (06.21.19 @ 10:23) >  Normal sinus rhythm  premature ectopic complexes  Nonspecific T wave abnormality  Abnormal ECG  No previous ECGs available    < end of copied text >    < from: Xray Chest 1 View-PORTABLE IMMEDIATE (06.21.19 @ 10:49) >  IMPRESSION:There are bilateral diffuse patchy airspace opacities which   may represent pulmonary edema or multifocal pneumonia. The patient is   status poststernotomy and CABG. There is a dual-lead left-sided cardiac   pacing device in place.    < end of copied text >      < from: TTE Echo Doppler w/o Cont (06.23.19 @ 08:44) >  Summary:   1. Left ventricular ejection fraction, by visual estimation, is 30 to   35%.   2. Technically limited study.   3. Normal global left ventricular systolic function.   4. Basal and mid anterior septum, basal inferoseptal segment, and basal   anterior segment are abnormal as described above.   5. Elevated left ventricular end-diastolic pressure.   6. Normal left ventricular internal cavity size.   7. Spectral Doppler shows pseudonormal pattern of left ventricular   myocardial filling (Grade II diastolic dysfunction).   8. Normal right ventricular size and function.   9. There is no evidence of pericardialeffusion.  10. Mild thickening and calcification of the anterior and posterior   mitral valve leaflets.  11. Mild to moderate mitral valve regurgitation.  12. Estimated pulmonary artery systolic pressure is 38.6 mmHg assuming a   right atrial pressure of 5 mmHg, which is consistent with borderline   pulmonary hypertension.    < end of copied text >    ASSESSMENT:  77 y/o M with HTN, HLD, DM II, CAD s/p CABG, HFrEF s/p ICD, and Chronic respiratory Failure (home 2L O2) presents to ER with Shortness of breath and Anxiety. The patient is visiting from [place] and states that he has been hospitalized several time in the past 4 months for this Shortness of breath including an ICU course where he was intubated. He describes Dyspnea with mild activity and swelling in his legs when he woke up this morning. He admits to feeling nausea, feeling anxious, denies CP, palpitations or dizziness. He denies fever, cough, vomiting, abdominal pain, diarrhea or dysuria.   CXR: bilateral opacities, BNP: 861, trop: .020, EKG: SR@85bpm, ?bigeminy, s/p IV Lasix 40mg. Improving. However, pt's dtr is concerned for behavioral disturbances lately. Had mild hypotension today. Responded to IVFs. Feeling better today and breathing more comfortably laying flat. Concerns over behavior from pt's dtr and staff noted.    PLAN:     Tele monitoring.    MEDICATIONS  (STANDING):  atorvastatin 10 milliGRAM(s) Oral at bedtime  melatonin 3 milliGRAM(s) Oral at bedtime  metFORMIN 500 milliGRAM(s) Oral two times a day  pantoprazole    Tablet 40 milliGRAM(s) Oral before breakfast  rivaroxaban 15 milliGRAM(s) Oral with dinner  sacubitril 49 mG/valsartan 51 mG 1 Tablet(s) Oral two times a day  ticagrelor 90 milliGRAM(s) Oral every 12 hours    care with diuretics. watch BP.   limit salt and fluid intake daily.  behavioral medication if needed ok.    Malcolm Ewing MD, FACC, RADHA HATHAWAY, FACP  Director, Heart Failure Services  [facility]  , Department of Cardiology  [facility]

## 2019-06-23 NOTE — DIETITIAN INITIAL EVALUATION ADULT. - PHYSICAL APPEARANCE
underweight/BMI= 20.0(06/21/19), Nutrition focused physical exam conducted; Subcutaneous fat Exam;  [ Severe  ]  Orbital fat pads region,  [ Moderate  ]Buccal fat region,  [ Severe  ]triceps region, [  Moderate ]ribs region.  Muscle Exam; [ Severe  ]temples region, [ Severe  ]clavicle region, [ Severe   ]shoulder region, [ Moderate  ]Scapula region, [ Severe   ]Interosseous region, [ Moderate  ]thigh region, [ Moderate   ]Calf region/other (specify)

## 2019-06-23 NOTE — CHART NOTE - NSCHARTNOTEFT_GEN_A_CORE
Upon Nutritional Assessment by the Registered Dietitian your patient was determined to meet criteria / has evidence of the following diagnosis/diagnoses:          [ ]  Mild Protein Calorie Malnutrition        [ ]  Moderate Protein Calorie Malnutrition        [x ] Severe Protein Calorie Malnutrition        [ ] Unspecified Protein Calorie Malnutrition        [ ] Underweight / BMI <19        [ ] Morbid Obesity / BMI > 40      Findings as based on:  •  Comprehensive nutrition assessment and consultation  •  Calorie counts (nutrient intake analysis)  •  Food acceptance and intake status from observations by staff  •  Follow up  •  Patient education  •  Intervention secondary to interdisciplinary rounds  •   concerns      Treatment:    The following diet has been recommended:  Dysphagia 3 Soft - Thin Liquids, Low sodium, consistent carbohydrate c evening snack ,  Glucerna Shake 2 x daily= 440 calories, 20 grams protein daily   Recommend swallow evaluation      PROVIDER Section:     By signing this assessment you are acknowledging and agree with the diagnosis/diagnoses assigned by the Registered Dietitian    Comments:

## 2019-06-23 NOTE — DIETITIAN INITIAL EVALUATION ADULT. - ENERGY NEEDS
Height (cm): 160.02 (06-21)  Weight (kg): 51.3 (06-21)  BMI (kg/m2): 20 (06-21)  IBW:  56.2 kg        % IBW:  91%           UBW: 65.7 kg             %UBW: 78%, wt. gain of 1.1 kg since adm noted

## 2019-06-23 NOTE — DIETITIAN INITIAL EVALUATION ADULT. - PERTINENT MEDS FT
MEDICATIONS  (STANDING):  atorvastatin 10 milliGRAM(s) Oral at bedtime  dextrose 5%. 1000 milliLiter(s) (50 mL/Hr) IV Continuous <Continuous>  dextrose 50% Injectable 12.5 Gram(s) IV Push once  dextrose 50% Injectable 25 Gram(s) IV Push once  dextrose 50% Injectable 25 Gram(s) IV Push once  melatonin 3 milliGRAM(s) Oral at bedtime  metFORMIN 500 milliGRAM(s) Oral two times a day  pantoprazole    Tablet 40 milliGRAM(s) Oral before breakfast  rivaroxaban 15 milliGRAM(s) Oral with dinner  sacubitril 49 mG/valsartan 51 mG 1 Tablet(s) Oral two times a day  ticagrelor 90 milliGRAM(s) Oral every 12 hours    MEDICATIONS  (PRN):  acetaminophen   Tablet .. 650 milliGRAM(s) Oral every 6 hours PRN Mild Pain (1 - 3)  ALPRAZolam 0.25 milliGRAM(s) Oral two times a day PRN Anxiety  dextrose 40% Gel 15 Gram(s) Oral once PRN Blood Glucose LESS THAN 70 milliGRAM(s)/deciliter  glucagon  Injectable 1 milliGRAM(s) IntraMuscular once PRN Glucose LESS THAN 70 milligrams/deciliter  ondansetron Injectable 4 milliGRAM(s) IV Push every 6 hours PRN Nausea and/or Vomiting

## 2019-06-23 NOTE — PROGRESS NOTE ADULT - PROBLEM SELECTOR PLAN 2
CXR: bilateral congestion, BNP: 861, LE edema POA   Pt appears Euvolemic after ER IV Lasix 40  will continue with home dose for now   Cardiology consult appreciated  f/u Echocardiogram  will hold diuresis for now due to hypotension
CXR: bilateral congestion, BNP: 861, LE edema POA   Pt appears Euvolemic after ER IV Lasix 40  will continue with home dose for now  will hold diuresis for now due to hypotension

## 2019-06-24 LAB
GLUCOSE BLDC GLUCOMTR-MCNC: 109 MG/DL — HIGH (ref 70–99)
GLUCOSE BLDC GLUCOMTR-MCNC: 111 MG/DL — HIGH (ref 70–99)
GLUCOSE BLDC GLUCOMTR-MCNC: 95 MG/DL — SIGNIFICANT CHANGE UP (ref 70–99)

## 2019-06-24 PROCEDURE — 99232 SBSQ HOSP IP/OBS MODERATE 35: CPT

## 2019-06-24 PROCEDURE — 99233 SBSQ HOSP IP/OBS HIGH 50: CPT

## 2019-06-24 PROCEDURE — 99231 SBSQ HOSP IP/OBS SF/LOW 25: CPT

## 2019-06-24 RX ORDER — INSULIN LISPRO 100/ML
VIAL (ML) SUBCUTANEOUS
Refills: 0 | Status: DISCONTINUED | OUTPATIENT
Start: 2019-06-24 | End: 2019-06-25

## 2019-06-24 RX ADMIN — TICAGRELOR 90 MILLIGRAM(S): 90 TABLET ORAL at 17:23

## 2019-06-24 RX ADMIN — SACUBITRIL AND VALSARTAN 1 TABLET(S): 24; 26 TABLET, FILM COATED ORAL at 17:23

## 2019-06-24 RX ADMIN — Medication 0.25 MILLIGRAM(S): at 11:39

## 2019-06-24 RX ADMIN — RIVAROXABAN 15 MILLIGRAM(S): KIT at 17:23

## 2019-06-24 RX ADMIN — Medication 3 MILLIGRAM(S): at 22:16

## 2019-06-24 RX ADMIN — Medication 0.25 MILLIGRAM(S): at 22:25

## 2019-06-24 RX ADMIN — METFORMIN HYDROCHLORIDE 500 MILLIGRAM(S): 850 TABLET ORAL at 08:57

## 2019-06-24 RX ADMIN — TICAGRELOR 90 MILLIGRAM(S): 90 TABLET ORAL at 06:01

## 2019-06-24 RX ADMIN — PANTOPRAZOLE SODIUM 40 MILLIGRAM(S): 20 TABLET, DELAYED RELEASE ORAL at 06:01

## 2019-06-24 RX ADMIN — METFORMIN HYDROCHLORIDE 500 MILLIGRAM(S): 850 TABLET ORAL at 17:22

## 2019-06-24 RX ADMIN — SACUBITRIL AND VALSARTAN 1 TABLET(S): 24; 26 TABLET, FILM COATED ORAL at 06:01

## 2019-06-24 RX ADMIN — ATORVASTATIN CALCIUM 10 MILLIGRAM(S): 80 TABLET, FILM COATED ORAL at 22:16

## 2019-06-24 NOTE — PROGRESS NOTE BEHAVIORAL HEALTH - NSBHFUPINTERVALHXFT_PSY_A_CORE
Patient reports feeling even better than last seen, feels optimistic about his health and asked if he could be transferred to [facility] as they know him well there. Patient is calm, cooperative, polite, pleasant and fully engages. He denies and also does not manifest any anxiety/anxiousness. Denies any symptoms of hypomania/roman/psychosis/major depression/ anxiety/panic. Denies any active or passive suicidal or homicidal ideation. Names protective factors (mariaa; family; hope for future). He has been using the low dose Xanax once a day, feels that it helps and denies adverse medication side effects.

## 2019-06-24 NOTE — PROGRESS NOTE ADULT - ASSESSMENT
77 y/o M with HTN, HLD, DM II, CAD s/p CABG, HFrEF s/p ICD, and Chronic respiratory Failure (home 2L O2) presents to ER with Shortness of breath and Anxiety. The patient is visiting from [place] and states that he has been hospitalized several time in the past 4 months for this Shortness of breath including an ICU course where he was intubated. He describes Dyspnea with mild activity and swelling in his legs. He admits to feeling nausea, feeling anxious, denies CP, palpitations or dizziness. He denies fever, cough, vomiting, abdominal pain, diarrhea or dysuria.   CXR: bilateral opacities, BNP: 861, trop: .020, s/p IV Lasix with status improving  seen by psych for concern for behavioral disturbances lately.  Had events of mild hypotension, Responded to IVFs.    ·	Acute on chronic resp failure  ·	Acute on chronic combined systolic/diastolic HF, s/p AICD  ·	Hypotension  ·	HLD  ·	DM2  ·	CAD with CABG Hx  ·	Anxiety    Plan  -will use diuretic dosing as needed  -salt and fluid restriction  -monitor electrolytes/daily weight  -cont with entresto, monitor renal function   - Cont with Xarelto/Brilinta/statin  -currently off bbl 2/2 low BP  -Psych consultation for behavioral disturbances 77 y/o M with HTN, HLD, DM II, CAD s/p CABG, HFrEF s/p ICD, Afib, and Chronic respiratory Failure (home 2L O2) presents to ER with Shortness of breath and Anxiety. The patient is visiting from [place] and states that he has been hospitalized several time in the past 4 months for this Shortness of breath including an ICU course where he was intubated. He describes Dyspnea with mild activity and swelling in his legs. He admits to feeling nausea, feeling anxious, denies CP, palpitations or dizziness. He denies fever, cough, vomiting, abdominal pain, diarrhea or dysuria.   CXR: bilateral opacities, BNP: 861, trop: .020, s/p IV Lasix with status improving  seen by psych for concern for behavioral disturbances lately.  Had events of mild hypotension, Responded to IVFs.    ·	Acute on chronic resp failure  ·	Acute on chronic combined systolic/diastolic HF, s/p AICD  ·	Hypotension  ·	Chronic afib  ·	HLD  ·	DM2  ·	CAD with CABG Hx  ·	Anxiety    Plan  -will use diuretic dosing as needed  -salt and fluid restriction  -monitor electrolytes/daily weight  -cont with entresto, monitor renal function   -Cont with Xarelto/Brilinta/statin  -currently off bbl 2/2 low BP  -Psych consultation for behavioral disturbances

## 2019-06-24 NOTE — PROGRESS NOTE BEHAVIORAL HEALTH - MOOD
[facility]  [address] [address]  Phone: [phone]    Name:  Niranjan Paz  Current Date: 2017  : 1979  MRN: [id number]   JOSE: [id number]    Visit Date: 3/28/2017  Address: [address] [address]  Phone: [phone]    Primary Care Provider     Name: Polly Gutierrez MD    Phone: [phone]    The staff of [facility] would like to thank you for allowing us to assist you with your healthcare needs. The following includes patient education materials and information on how best to care for your illness/injury at home and when to see a physician. If you need to locate a Doctor or clinic close to you, please call the Doctor Referral Service at [phone]. The Service is available Monday through Thursday from 8 AM to 8 PM and  from 8 AM to 4 PM.    Patients Please Note: If further time off is required, or a medical clearance to return to work is required, it must be obtained through your primary physician.  Return to work clearances and extensions of \"Time-Off\" will not be given by the Emergency Department.     We hope that you leave our Emergency Department believing that we provided you with very good care.   Your Opinion Matters To Us  If you receive a patient satisfaction survey in the mail, please complete and return it in the postage-paid envelope.  We truly value and appreciate your feedback.  Emergency Department Care Providers   Physician:Polly Segura MD    Advanced Practice Provider:  No providers found     RN_________________ ED Tech__________________ Clerical_________________         Normal

## 2019-06-24 NOTE — PROGRESS NOTE ADULT - SUBJECTIVE AND OBJECTIVE BOX
Patient is a 78y old  Male who presents with a chief complaint of Shortness of breath, Anxiety (23 Jun 2019 16:38)    PAST MEDICAL & SURGICAL HISTORY:  Hyperlipidemia  Chronic hypoxemic respiratory failure  CAD (coronary atherosclerotic disease)  HTN (hypertension)  CHF (congestive heart failure)  Diabetes  S/P CABG x 3  AICD    INTERVAL HISTORY:  	  MEDICATIONS:  MEDICATIONS  (STANDING):  atorvastatin 10 milliGRAM(s) Oral at bedtime  dextrose 5%. 1000 milliLiter(s) (50 mL/Hr) IV Continuous <Continuous>  dextrose 50% Injectable 12.5 Gram(s) IV Push once  dextrose 50% Injectable 25 Gram(s) IV Push once  dextrose 50% Injectable 25 Gram(s) IV Push once  melatonin 3 milliGRAM(s) Oral at bedtime  metFORMIN 500 milliGRAM(s) Oral two times a day  pantoprazole    Tablet 40 milliGRAM(s) Oral before breakfast  rivaroxaban 15 milliGRAM(s) Oral with dinner  sacubitril 49 mG/valsartan 51 mG 1 Tablet(s) Oral two times a day  ticagrelor 90 milliGRAM(s) Oral every 12 hours    MEDICATIONS  (PRN):  acetaminophen   Tablet .. 650 milliGRAM(s) Oral every 6 hours PRN Mild Pain (1 - 3)  ALPRAZolam 0.25 milliGRAM(s) Oral two times a day PRN Anxiety  dextrose 40% Gel 15 Gram(s) Oral once PRN Blood Glucose LESS THAN 70 milliGRAM(s)/deciliter  glucagon  Injectable 1 milliGRAM(s) IntraMuscular once PRN Glucose LESS THAN 70 milligrams/deciliter  ondansetron Injectable 4 milliGRAM(s) IV Push every 6 hours PRN Nausea and/or Vomiting    Vitals:  T(F): 98.2 (06-24-19 @ 11:24), Max: 98.2 (06-24-19 @ 11:24)  HR: 105 (06-24-19 @ 11:24) (83 - 105)  BP: 104/51 (06-24-19 @ 11:24) (104/51 - 120/70)  RR: 19 (06-24-19 @ 11:24) (18 - 20)  SpO2: 100% (06-24-19 @ 11:24) (94% - 100%)  Wt(kg): --52.8 kg    Weight (kg): 51.3 (06-21 @ 15:56)    PHYSICAL EXAM:  Neuro: Awake, responsive  CV: S1 S2 RRR  Lungs: CTABL  GI: Soft, BS +, ND, NT  Extremities: No edema    TELEMETRY: Atrial fibrillation with frequent PVCs     RADIOLOGY: < from: Xray Chest 1 View-PORTABLE IMMEDIATE (06.21.19 @ 10:49) >  IMPRESSION:There are bilateral diffuse patchy airspace opacities which   may represent pulmonary edema or multifocal pneumonia. The patient is   status poststernotomy and CABG. There is a dual-lead left-sided cardiac   pacing device in place.    < end of copied text >      DIAGNOSTIC TESTING:    [x ] Echocardiogram: < from: TTE Echo Doppler w/o Cont (06.23.19 @ 08:44) >   1. Left ventricular ejection fraction, by visual estimation, is 30 to   35%.   2. Technically limited study.   3. Normal global left ventricular systolic function.   4. Basal and mid anterior septum, basal inferoseptal segment, and basal   anterior segment are abnormal as described above.   5. Elevated left ventricular end-diastolic pressure.   6. Normal left ventricular internal cavity size.   7. Spectral Doppler shows pseudonormal pattern of left ventricular   myocardial filling (Grade II diastolic dysfunction).   8. Normal right ventricular size and function.   9. There is no evidence of pericardialeffusion.  10. Mild thickening and calcification of the anterior and posterior   mitral valve leaflets.  11. Mild to moderate mitral valve regurgitation.  12. Estimated pulmonary artery systolic pressure is 38.6 mmHg assuming a   right atrial pressure of 5 mmHg, which is consistent with borderline   pulmonary hypertension.    < end of copied text >    LABS:	 	    23 Jun 2019 07:20    135    |  102    |  30     ----------------------------<  100    4.8     |  23     |  1.31   22 Jun 2019 06:30    135    |  98     |  23     ----------------------------<  103    4.3     |  28     |  1.09     Ca    8.4        23 Jun 2019 07:20  Phos  3.8       23 Jun 2019 07:20  Mg     2.0       23 Jun 2019 07:20                        11.0   6.04  )-----------( 232      ( 23 Jun 2019 07:20 )             35.8 ,                       11.6   7.07  )-----------( 244      ( 22 Jun 2019 06:30 )             36.8     Lipid Profile: 06-22 @ 10:51  HDL/Total Cholesterol: --  HDL Chol:              35 mg/dL  Serum Chol:            118 mg/dL  Direct LDL:            67 mg/dL  Triglycerides:         80 mg/dL    HgA1c: Hemoglobin A1C, Whole Blood: 6.1 % (06-22 @ 10:45)    TSH: Thyroid Stimulating Hormone, Serum: 4.880 uU/mL (06-23 @ 07:20) Patient is a 78y old  Male who presents with a chief complaint of Shortness of breath, Anxiety (23 Jun 2019 16:38)    PAST MEDICAL & SURGICAL HISTORY:  Hyperlipidemia  Afib  Chronic hypoxemic respiratory failure  CAD (coronary atherosclerotic disease)  HTN (hypertension)  CHF (congestive heart failure)  Diabetes  S/P CABG x 3  AICD    INTERVAL HISTORY: Resting in chair, not in any acute distress, breathing better, denies any chest pain or dizziness   	  MEDICATIONS:  MEDICATIONS  (STANDING):  atorvastatin 10 milliGRAM(s) Oral at bedtime  dextrose 5%. 1000 milliLiter(s) (50 mL/Hr) IV Continuous <Continuous>  dextrose 50% Injectable 12.5 Gram(s) IV Push once  dextrose 50% Injectable 25 Gram(s) IV Push once  dextrose 50% Injectable 25 Gram(s) IV Push once  melatonin 3 milliGRAM(s) Oral at bedtime  metFORMIN 500 milliGRAM(s) Oral two times a day  pantoprazole    Tablet 40 milliGRAM(s) Oral before breakfast  rivaroxaban 15 milliGRAM(s) Oral with dinner  sacubitril 49 mG/valsartan 51 mG 1 Tablet(s) Oral two times a day  ticagrelor 90 milliGRAM(s) Oral every 12 hours    MEDICATIONS  (PRN):  acetaminophen   Tablet .. 650 milliGRAM(s) Oral every 6 hours PRN Mild Pain (1 - 3)  ALPRAZolam 0.25 milliGRAM(s) Oral two times a day PRN Anxiety  dextrose 40% Gel 15 Gram(s) Oral once PRN Blood Glucose LESS THAN 70 milliGRAM(s)/deciliter  glucagon  Injectable 1 milliGRAM(s) IntraMuscular once PRN Glucose LESS THAN 70 milligrams/deciliter  ondansetron Injectable 4 milliGRAM(s) IV Push every 6 hours PRN Nausea and/or Vomiting    Vitals:  T(F): 98.2 (06-24-19 @ 11:24), Max: 98.2 (06-24-19 @ 11:24)  HR: 105 (06-24-19 @ 11:24) (83 - 105)  BP: 104/51 (06-24-19 @ 11:24) (104/51 - 120/70)  RR: 19 (06-24-19 @ 11:24) (18 - 20)  SpO2: 100% (06-24-19 @ 11:24) (94% - 100%)  Wt(kg): --52.8 kg    Weight (kg): 51.3 (06-21 @ 15:56)    PHYSICAL EXAM:  Neuro: Awake, responsive  CV: S1 S2 RRR  Lungs: CTABL  GI: Soft, BS +, ND, NT  Extremities: No edema    TELEMETRY: RSR with frequent PVCs     RADIOLOGY: < from: Xray Chest 1 View-PORTABLE IMMEDIATE (06.21.19 @ 10:49) >  IMPRESSION:There are bilateral diffuse patchy airspace opacities which   may represent pulmonary edema or multifocal pneumonia. The patient is   status poststernotomy and CABG. There is a dual-lead left-sided cardiac   pacing device in place.    < end of copied text >    DIAGNOSTIC TESTING:    [x ] Echocardiogram: < from: TTE Echo Doppler w/o Cont (06.23.19 @ 08:44) >   1. Left ventricular ejection fraction, by visual estimation, is 30 to   35%.   2. Technically limited study.   3. Normal global left ventricular systolic function.   4. Basal and mid anterior septum, basal inferoseptal segment, and basal   anterior segment are abnormal as described above.   5. Elevated left ventricular end-diastolic pressure.   6. Normal left ventricular internal cavity size.   7. Spectral Doppler shows pseudonormal pattern of left ventricular   myocardial filling (Grade II diastolic dysfunction).   8. Normal right ventricular size and function.   9. There is no evidence of pericardialeffusion.  10. Mild thickening and calcification of the anterior and posterior   mitral valve leaflets.  11. Mild to moderate mitral valve regurgitation.  12. Estimated pulmonary artery systolic pressure is 38.6 mmHg assuming a   right atrial pressure of 5 mmHg, which is consistent with borderline   pulmonary hypertension.    < end of copied text >    LABS:	 	    23 Jun 2019 07:20    135    |  102    |  30     ----------------------------<  100    4.8     |  23     |  1.31   22 Jun 2019 06:30    135    |  98     |  23     ----------------------------<  103    4.3     |  28     |  1.09     Ca    8.4        23 Jun 2019 07:20  Phos  3.8       23 Jun 2019 07:20  Mg     2.0       23 Jun 2019 07:20                        11.0   6.04  )-----------( 232      ( 23 Jun 2019 07:20 )             35.8 ,                       11.6   7.07  )-----------( 244      ( 22 Jun 2019 06:30 )             36.8     Lipid Profile: 06-22 @ 10:51  HDL/Total Cholesterol: --  HDL Chol:              35 mg/dL  Serum Chol:            118 mg/dL  Direct LDL:            67 mg/dL  Triglycerides:         80 mg/dL    HgA1c: Hemoglobin A1C, Whole Blood: 6.1 % (06-22 @ 10:45)    TSH: Thyroid Stimulating Hormone, Serum: 4.880 uU/mL (06-23 @ 07:20)

## 2019-06-24 NOTE — PROGRESS NOTE ADULT - SUBJECTIVE AND OBJECTIVE BOX
77 yo M w/ history of CAD status post CABG, systolic/diastolic CHF status post ACID, Chronic atrial fibrillation, 77 yo M w/ history of CAD status post CABG, systolic/diastolic CHF, Chronic atrial fibrillation p/w acute on chronic respiratory failure due to acute on chronic combined systolic/diastolic CHF. He is lying in bed in NAD.    MEDICATIONS  (STANDING):  atorvastatin 10 milliGRAM(s) Oral at bedtime  dextrose 5%. 1000 milliLiter(s) (50 mL/Hr) IV Continuous <Continuous>  dextrose 50% Injectable 12.5 Gram(s) IV Push once  dextrose 50% Injectable 25 Gram(s) IV Push once  dextrose 50% Injectable 25 Gram(s) IV Push once  melatonin 3 milliGRAM(s) Oral at bedtime  metFORMIN 500 milliGRAM(s) Oral two times a day  pantoprazole    Tablet 40 milliGRAM(s) Oral before breakfast  rivaroxaban 15 milliGRAM(s) Oral with dinner  sacubitril 49 mG/valsartan 51 mG 1 Tablet(s) Oral two times a day  ticagrelor 90 milliGRAM(s) Oral every 12 hours    MEDICATIONS  (PRN):  acetaminophen   Tablet .. 650 milliGRAM(s) Oral every 6 hours PRN Mild Pain (1 - 3)  ALPRAZolam 0.25 milliGRAM(s) Oral two times a day PRN Anxiety  dextrose 40% Gel 15 Gram(s) Oral once PRN Blood Glucose LESS THAN 70 milliGRAM(s)/deciliter  glucagon  Injectable 1 milliGRAM(s) IntraMuscular once PRN Glucose LESS THAN 70 milligrams/deciliter  ondansetron Injectable 4 milliGRAM(s) IV Push every 6 hours PRN Nausea and/or Vomiting      Allergies    No Known Allergies    Intolerances        Vital Signs Last 24 Hrs  T(C): 37 (24 Jun 2019 16:08), Max: 37 (24 Jun 2019 16:08)  T(F): 98.6 (24 Jun 2019 16:08), Max: 98.6 (24 Jun 2019 16:08)  HR: 89 (24 Jun 2019 16:08) (89 - 105)  BP: 113/66 (24 Jun 2019 16:08) (104/51 - 120/70)  BP(mean): 65 (24 Jun 2019 11:24) (65 - 65)  RR: 19 (24 Jun 2019 16:08) (19 - 20)  SpO2: 100% (24 Jun 2019 16:08) (100% - 100%)    PHYSICAL EXAM:  GENERAL: NAD, well-groomed, well-developed  HEAD:  Atraumatic, Normocephalic  EYES: EOMI, PERRLA   NECK: Supple   NERVOUS SYSTEM: Alert  c  CHEST/LUNG: Clear to auscultation bilaterally; No rales, rhonchi, wheezing, or rubs  HEART: Regular rate and irregularly irregular rhythm   ABDOMEN: Soft, Nontender, Nondistended; Bowel sounds present  EXTREMITIES: No clubbing, cyanosis, or edema     LABS:                        11.0   6.04  )-----------( 232      ( 23 Jun 2019 07:20 )             35.8     06-23    135  |  102  |  30<H>  ----------------------------<  100<H>  4.8   |  23  |  1.31<H>    Ca    8.4<L>      23 Jun 2019 07:20  Phos  3.8     06-23  Mg     2.0     06-23          CAPILLARY BLOOD GLUCOSE      POCT Blood Glucose.: 95 mg/dL (24 Jun 2019 16:37)  POCT Blood Glucose.: 111 mg/dL (24 Jun 2019 08:21)  POCT Blood Glucose.: 117 mg/dL (23 Jun 2019 21:55)      RADIOLOGY & ADDITIONAL TESTS:

## 2019-06-24 NOTE — PROGRESS NOTE BEHAVIORAL HEALTH - SUMMARY
- can get Xanax 0.25mg PO QD PRN x 7 days on discharge; no evidence of misuse   - cleared for discharge

## 2019-06-24 NOTE — PROGRESS NOTE ADULT - ASSESSMENT
79 yo M w/ history of CAD status post CABG, systolic/diastolic CHF status post ACID, Chronic atrial fibrillation, 79 yo M w/ history of CAD status post CABG, systolic/diastolic CHF, Chronic atrial fibrillation p/w acute on chronic respiratory failure due to acute on chronic combined systolic/diastolic CHF.     Acute on chronic combined systolic/diastolic congestive heart failure  - CXR showed bilateral congestion w/  & LE edema POA   - patient improved w/ IV Lasix 40 in the ED  - c/w entresto & lipitor  - holding diuresis for now due to hypotension  - Echo showed EF 30- 35%  w/ grade II diastolic dysfunction & mild to moderate mitral valve regurgitation    Elevated TSH  - check free T4    DM II  - c/w Metformin   - c/w ISS  - Hgb A1C is 6.1    CAD status post CABG  - c/w Entresto, Ticagrelor and Lipitor     Anxiety  - Psych consult appreciated   - may be prolonged delirium post recent intubation vs. early dementia   - Xanax prn anxiety    Essential hypertension  - Entresto started  - holding BB due to low BP    Chronic atrial fibrillation  - c/w Xarelto  - BB on hold due to low BP    Prophylaxis:  DVT: Xarelto   GI: Protonix    Dispo: Discharge tomorrow if BP remains stable.

## 2019-06-25 ENCOUNTER — TRANSCRIPTION ENCOUNTER (OUTPATIENT)
Age: 79
End: 2019-06-25

## 2019-06-25 VITALS
HEART RATE: 79 BPM | DIASTOLIC BLOOD PRESSURE: 58 MMHG | SYSTOLIC BLOOD PRESSURE: 109 MMHG | TEMPERATURE: 98 F | RESPIRATION RATE: 18 BRPM | OXYGEN SATURATION: 100 %

## 2019-06-25 DIAGNOSIS — R41.89 OTHER SYMPTOMS AND SIGNS INVOLVING COGNITIVE FUNCTIONS AND AWARENESS: ICD-10-CM

## 2019-06-25 LAB
ANION GAP SERPL CALC-SCNC: 7 MMOL/L — SIGNIFICANT CHANGE UP (ref 5–17)
BUN SERPL-MCNC: 24 MG/DL — HIGH (ref 7–23)
CALCIUM SERPL-MCNC: 8.4 MG/DL — LOW (ref 8.5–10.1)
CHLORIDE SERPL-SCNC: 105 MMOL/L — SIGNIFICANT CHANGE UP (ref 96–108)
CO2 SERPL-SCNC: 24 MMOL/L — SIGNIFICANT CHANGE UP (ref 22–31)
CREAT SERPL-MCNC: 0.72 MG/DL — SIGNIFICANT CHANGE UP (ref 0.5–1.3)
GLUCOSE SERPL-MCNC: 99 MG/DL — SIGNIFICANT CHANGE UP (ref 70–99)
MAGNESIUM SERPL-MCNC: 2.2 MG/DL — SIGNIFICANT CHANGE UP (ref 1.6–2.6)
POTASSIUM SERPL-MCNC: 3.9 MMOL/L — SIGNIFICANT CHANGE UP (ref 3.5–5.3)
POTASSIUM SERPL-SCNC: 3.9 MMOL/L — SIGNIFICANT CHANGE UP (ref 3.5–5.3)
SODIUM SERPL-SCNC: 136 MMOL/L — SIGNIFICANT CHANGE UP (ref 135–145)
T4 FREE SERPL-MCNC: 1 NG/DL — SIGNIFICANT CHANGE UP (ref 0.9–1.8)
TSH SERPL-MCNC: 3.09 UIU/ML — SIGNIFICANT CHANGE UP (ref 0.36–3.74)

## 2019-06-25 PROCEDURE — 99239 HOSP IP/OBS DSCHRG MGMT >30: CPT

## 2019-06-25 PROCEDURE — 99231 SBSQ HOSP IP/OBS SF/LOW 25: CPT

## 2019-06-25 PROCEDURE — 99358 PROLONG SERVICE W/O CONTACT: CPT

## 2019-06-25 RX ORDER — MIRTAZAPINE 45 MG/1
7.5 TABLET, ORALLY DISINTEGRATING ORAL AT BEDTIME
Refills: 0 | Status: DISCONTINUED | OUTPATIENT
Start: 2019-06-25 | End: 2019-06-25

## 2019-06-25 RX ORDER — MIRTAZAPINE 45 MG/1
1 TABLET, ORALLY DISINTEGRATING ORAL
Qty: 30 | Refills: 0
Start: 2019-06-25 | End: 2019-07-24

## 2019-06-25 RX ORDER — ALPRAZOLAM 0.25 MG
1 TABLET ORAL
Qty: 14 | Refills: 0
Start: 2019-06-25 | End: 2019-07-01

## 2019-06-25 RX ORDER — OLANZAPINE 15 MG/1
1 TABLET, FILM COATED ORAL
Qty: 60 | Refills: 0
Start: 2019-06-25 | End: 2019-07-24

## 2019-06-25 RX ORDER — METOPROLOL TARTRATE 50 MG
1 TABLET ORAL
Qty: 0 | Refills: 0 | DISCHARGE

## 2019-06-25 RX ORDER — FUROSEMIDE 40 MG
1 TABLET ORAL
Qty: 0 | Refills: 0 | DISCHARGE

## 2019-06-25 RX ORDER — AMLODIPINE BESYLATE 2.5 MG/1
1 TABLET ORAL
Qty: 0 | Refills: 0 | DISCHARGE

## 2019-06-25 RX ADMIN — PANTOPRAZOLE SODIUM 40 MILLIGRAM(S): 20 TABLET, DELAYED RELEASE ORAL at 05:40

## 2019-06-25 RX ADMIN — SACUBITRIL AND VALSARTAN 1 TABLET(S): 24; 26 TABLET, FILM COATED ORAL at 16:10

## 2019-06-25 RX ADMIN — Medication 0.25 MILLIGRAM(S): at 15:13

## 2019-06-25 RX ADMIN — TICAGRELOR 90 MILLIGRAM(S): 90 TABLET ORAL at 05:40

## 2019-06-25 RX ADMIN — Medication 0.25 MILLIGRAM(S): at 09:11

## 2019-06-25 RX ADMIN — METFORMIN HYDROCHLORIDE 500 MILLIGRAM(S): 850 TABLET ORAL at 16:11

## 2019-06-25 RX ADMIN — TICAGRELOR 90 MILLIGRAM(S): 90 TABLET ORAL at 16:11

## 2019-06-25 RX ADMIN — RIVAROXABAN 15 MILLIGRAM(S): KIT at 16:11

## 2019-06-25 RX ADMIN — SACUBITRIL AND VALSARTAN 1 TABLET(S): 24; 26 TABLET, FILM COATED ORAL at 05:41

## 2019-06-25 RX ADMIN — METFORMIN HYDROCHLORIDE 500 MILLIGRAM(S): 850 TABLET ORAL at 09:12

## 2019-06-25 NOTE — DISCHARGE NOTE PROVIDER - HOSPITAL COURSE
77 yo M w/ history of CAD status post CABG, systolic/diastolic CHF status post ACID, Chronic atrial fibrillation, 77 yo M w/ history of CAD status post CABG, systolic/diastolic CHF, Chronic atrial fibrillation p/w acute on chronic respiratory failure due to acute on chronic combined systolic/diastolic CHF. CXR showed bilateral congestion w/  & LE edema POA. Patient improved w/ IV Lasix 40 in the ED and was restarted on Entresto & Lipitor.     Echo showed EF 30- 35%  w/ grade II diastolic dysfunction & mild to moderate mitral valve regurgitation. Pt became euvolemic and cardio recommended holding diuresis & BB for for now due to borderline BP.  Pt's TSH was elevated on admission, but repeat TSH & free T4 were normal. Psych was also consulted for anxiety and episodes of confusion which may be due to new onset dementia vs delirium post recent intubation. Pt was seen by Dr. Márquez, who cleared him for discharge on Xanax. I spoke w/ both the pt's son and daughter today.         Discharge time: 43 minutes 79 yo M w/ history of CAD status post CABG, systolic/diastolic CHF status post ACID, Chronic atrial fibrillation, 79 yo M w/ history of CAD status post CABG, systolic/diastolic CHF, Chronic atrial fibrillation p/w acute on chronic respiratory failure due to acute on chronic combined systolic/diastolic CHF. CXR showed bilateral congestion w/  & LE edema POA. Patient improved w/ IV Lasix 40 in the ED and was restarted on Entresto & Lipitor.     Echo showed EF 30- 35%  w/ grade II diastolic dysfunction & mild to moderate mitral valve regurgitation. Pt became euvolemic and cardio recommended holding diuresis & BB for for now due to borderline BP.  Pt's TSH was elevated on admission, but repeat TSH & free T4 were normal. Psych was also consulted for anxiety and episodes of confusion which may be due to new onset dementia vs delirium post recent intubation. Pt was seen by Dr. Márquez, who cleared him for discharge on Xanax, Remeron and zyprexa. I spoke w/ both the pt's son and daughter today.         Discharge time: 43 minutes

## 2019-06-25 NOTE — PROGRESS NOTE BEHAVIORAL HEALTH - SUMMARY
- can get Xanax 0.25mg PO QD PRN x 7 days on discharge; no evidence of misuse   - cleared for discharge with recommendation that he can follow up at [facility] Monday-Friday 9am-6pm as Patient will remain in [place] for next few months as per family but not indefinitely; also there is a > 1 month wait time at regular clinics. - Remeron 7.5mg PO qhs PRN for insomnia   - can get Xanax 0.25mg PO QD PRN x 7 days on discharge; no evidence of misuse   - no grounds for involuntary psychiatric admission / does not need inpatient level of care   - cleared for discharge with recommendation that he can follow up at [facility] Monday-Friday 9am-6pm as Patient will remain in [place] for next few months as per family but not indefinitely; also there is a > 1 month wait time at regular clinics. - Zyprexa 2.5mg PO up to 2x/day PRN  for agitation (sent to pharmacy by Writer); discussed w/ daughter to try only 2.5mg PO qhs first    - Remeron 7.5mg PO qhs PRN for insomnia / sleep disturbances (sent to pharmacy by Writer)  - Xanax 0.25mg PO up to 2x/day PRN for anxiety (would give only for a week )  - Patient has a Neurology intake appointment in the system on 7/7 (ideally, Neuro could also adjust his psych medications as it is used for behavioral issues secondary to cognitive decline)   - needs an outpatient Cardiologist  - utilize [facility] Monday-Friday 9am-6pm PRN; [address] [place], [place] [address]  phone [phone]

## 2019-06-25 NOTE — PROGRESS NOTE BEHAVIORAL HEALTH - NSBHFUPINTERVALHXFT_PSY_A_CORE
Writer called Pt's daughter Cecy at [phone]; no answer so left message and gave CL office # to call back. Writer called Pt's daughter Cecy at [phone] as per request; no answer so left message and gave CL office # to call back. Patient had difficulty falling asleep last night and received melatonin. same clinical presentation - has been napping on/off, would like to leave already as he "had enough" of the hospital. No complaints. Feels better since admission.     COLLATERAL FROM DAUGHTER JACKIE [phone]; very long phone conversation held and extensive psychoeducation, prognosis, likely subsequent symptoms, risks/benefits of treatment including FDA Black Box Warnings discussed. Daughter would like Patient to have medications at home for agitation, sleep. He has a Neurology intake appointment in the system on 7/7. Daughter basically describes scenario for onset-of cognitive decline which have been triggered by multiple hospital admissions for acute medical issues including 8 days of intubation and rehab stint. Patient came to [place] from [place] 2 weeks ago and has sleep disturbances (wakes up family at night saying odd things, anxious), more reactive, irritable at times; stubborn and perseverates on things and does not let it go; getting confused at times such as reporting that he sees a Cardiologist at [facility] regularly when in fact he last saw a Dr Andrade years ago. Patient attempted to leave [facility] last week and caused a ruckus, was agitated and had to be given risperdal. Daughter already looked into a Assisted Living facility in [place] as she feels like Pt is no longer able to live alone.

## 2019-06-25 NOTE — PROGRESS NOTE BEHAVIORAL HEALTH - NSBHCONSULTFOLLOWAFTERCARE_PSY_A_CORE FT
if cognitive/behavioral changes continue, can see an outpatient geriatric psychiatrist or neurologist for monitoring see above

## 2019-06-25 NOTE — DISCHARGE NOTE PROVIDER - CARE PROVIDER_API CALL
Malcolm Ewing)  Internal Medicine; Nuclear Cardiology  [address] [place] [address]  Phone: [phone]  Fax: [phone]  Follow Up Time:     Festus Pyle)  Internal Medicine  [address], [address]  [place] [address]  Phone: [phone]  Fax: [phone]  Follow Up Time:     .,   [facility] walk in clinic  [address] [place] [address]  Phone: [phone]  Phone: (   )    -  Fax: (   )    -  Follow Up Time:

## 2019-06-25 NOTE — DISCHARGE NOTE PROVIDER - PROVIDER TOKENS
PROVIDER:[TOKEN:[[id number]]],PROVIDER:[TOKEN:[[id number]]],FREE:[LAST:[.],PHONE:[(   )    -],FAX:[(   )    -],ADDRESS:[[facility] walk in clinic  [facility] [place] [address]  Phone: [phone]]]

## 2019-06-25 NOTE — DISCHARGE NOTE NURSING/CASE MANAGEMENT/SOCIAL WORK - NSDCPEEMAIL_GEN_ALL_CORE
[facility] for Tobacco Control email [email or web address]

## 2019-06-25 NOTE — PROGRESS NOTE ADULT - REASON FOR ADMISSION
Shortness of breath, Anxiety

## 2019-06-25 NOTE — DISCHARGE NOTE PROVIDER - CARE PROVIDERS DIRECT ADDRESSES
,[email or web address]_Unknown

## 2019-06-25 NOTE — DISCHARGE NOTE PROVIDER - NSDCCPCAREPLAN_GEN_ALL_CORE_FT
PRINCIPAL DISCHARGE DIAGNOSIS  Diagnosis: CHF (congestive heart failure)  Assessment and Plan of Treatment:       SECONDARY DISCHARGE DIAGNOSES  Diagnosis: Aggressive behavior  Assessment and Plan of Treatment:

## 2019-06-25 NOTE — PROGRESS NOTE ADULT - ASSESSMENT
79 y/o M with HTN, HLD, DM II, CAD s/p CABG, HFrEF s/p ICD, Afib, and Chronic respiratory Failure (home 2L O2) presents to ER with Shortness of breath and Anxiety. The patient is visiting from [place] and states that he has been hospitalized several time in the past 4 months for this Shortness of breath including an ICU course where he was intubated. He describes Dyspnea with mild activity and swelling in his legs. He admits to feeling nausea, feeling anxious, denies CP, palpitations or dizziness. He denies fever, cough, vomiting, abdominal pain, diarrhea or dysuria.   CXR: bilateral opacities, BNP: 861, trop: .020, s/p IV Lasix with status improving  seen by psych for concern for behavioral disturbances lately.  Had events of mild hypotension, Responded to IVFs.    ·	Acute on chronic resp failure  ·	Acute on chronic combined systolic/diastolic HF, s/p AICD  ·	Hypotension  ·	Chronic afib  ·	HLD  ·	DM2  ·	CAD with CABG Hx  ·	Anxiety    Plan  -will use diuretic dosing as needed  -salt and fluid restriction  -monitor electrolytes/daily weight  -cont with entresto, monitor renal function   -Cont with Xarelto/Brilinta/statin  -currently off bbl 2/2 low BP, will restart BBL when BP is stabilized.   -Psych consultation for behavioral disturbances  -pt will follow with cardiology at [facility].

## 2019-06-25 NOTE — DISCHARGE NOTE NURSING/CASE MANAGEMENT/SOCIAL WORK - NSDCPEWEB_GEN_ALL_CORE
[place] website --- [email or web address] for Tobacco [email or web address] website --- [email or web address]

## 2019-06-25 NOTE — PROGRESS NOTE ADULT - SUBJECTIVE AND OBJECTIVE BOX
Patient is a 78y old  Male who presents with a chief complaint of Shortness of breath, Anxiety (24 Jun 2019 18:09)    PAST MEDICAL & SURGICAL HISTORY:  Hyperlipidemia  Chronic hypoxemic respiratory failure  CAD (coronary atherosclerotic disease)  HTN (hypertension)  CHF (congestive heart failure)  Diabetes  S/P CABG x 3  AICD    INTERVAL HISTORY: Resting in chair in NAD, anxious to go home, denies chest pain, SOB, edema.   	  MEDICATIONS:  MEDICATIONS  (STANDING):  atorvastatin 10 milliGRAM(s) Oral at bedtime  dextrose 5%. 1000 milliLiter(s) (50 mL/Hr) IV Continuous <Continuous>  dextrose 50% Injectable 12.5 Gram(s) IV Push once  dextrose 50% Injectable 25 Gram(s) IV Push once  dextrose 50% Injectable 25 Gram(s) IV Push once  insulin lispro (HumaLOG) corrective regimen sliding scale   SubCutaneous three times a day before meals  melatonin 3 milliGRAM(s) Oral at bedtime  metFORMIN 500 milliGRAM(s) Oral two times a day  pantoprazole    Tablet 40 milliGRAM(s) Oral before breakfast  rivaroxaban 15 milliGRAM(s) Oral with dinner  sacubitril 49 mG/valsartan 51 mG 1 Tablet(s) Oral two times a day  ticagrelor 90 milliGRAM(s) Oral every 12 hours    MEDICATIONS  (PRN):  acetaminophen   Tablet .. 650 milliGRAM(s) Oral every 6 hours PRN Mild Pain (1 - 3)  ALPRAZolam 0.25 milliGRAM(s) Oral two times a day PRN Anxiety  dextrose 40% Gel 15 Gram(s) Oral once PRN Blood Glucose LESS THAN 70 milliGRAM(s)/deciliter  glucagon  Injectable 1 milliGRAM(s) IntraMuscular once PRN Glucose LESS THAN 70 milligrams/deciliter  mirtazapine 7.5 milliGRAM(s) Oral at bedtime PRN insomnia  ondansetron Injectable 4 milliGRAM(s) IV Push every 6 hours PRN Nausea and/or Vomiting      Vitals:  T(F): 97.8 (06-25-19 @ 11:07), Max: 98.6 (06-24-19 @ 16:08)  HR: 79 (06-25-19 @ 11:07) (79 - 107)  BP: 109/58 (06-25-19 @ 11:07) (109/58 - 129/61)  RR: 18 (06-25-19 @ 11:07) (18 - 19)  SpO2: 100% (06-25-19 @ 11:07) (97% - 100%)  Wt(kg): 53.2 kg    06-25 @ 07:01  -  06-25 @ 12:06  --------------------------------------------------------  IN:    Oral Fluid: 200 mL  Total IN: 200 mL    OUT:  Total OUT: 0 mL    Total NET: 200 mL    PHYSICAL EXAM:  Neuro: Awake, responsive  CV: S1 S2 RRR  Lungs: CTABL  GI: Soft, BS +, ND, NT  Extremities: No edema    TELEMETRY: RSR    RADIOLOGY: < from: Xray Chest 1 View-PORTABLE IMMEDIATE (06.21.19 @ 10:49) >  IMPRESSION:There are bilateral diffuse patchy airspace opacities which   may represent pulmonary edema or multifocal pneumonia. The patient is   status poststernotomy and CABG. There is a dual-lead left-sided cardiac   pacing device in place.    < end of copied text >    DIAGNOSTIC TESTING:    [x ] Echocardiogram: < from: TTE Echo Doppler w/o Cont (06.23.19 @ 08:44) >   1. Left ventricular ejection fraction, by visual estimation, is 30 to   35%.   2. Technically limited study.   3. Normal global left ventricular systolic function.   4. Basal and mid anterior septum, basal inferoseptal segment, and basal   anterior segment are abnormal as described above.   5. Elevated left ventricular end-diastolic pressure.   6. Normal left ventricular internal cavity size.   7. Spectral Doppler shows pseudonormal pattern of left ventricular   myocardial filling (Grade II diastolic dysfunction).   8. Normal right ventricular size and function.   9. There is no evidence of pericardialeffusion.  10. Mild thickening and calcification of the anterior and posterior   mitral valve leaflets.  11. Mild to moderate mitral valve regurgitation.  12. Estimated pulmonary artery systolic pressure is 38.6 mmHg assuming a   right atrial pressure of 5 mmHg, which is consistent with borderline   pulmonary hypertension.    < end of copied text >    LABS:	 	  25 Jun 2019 07:17    136    |  105    |  24     ----------------------------<  99     3.9     |  24     |  0.72   23 Jun 2019 07:20    135    |  102    |  30     ----------------------------<  100    4.8     |  23     |  1.31     Ca    8.4        25 Jun 2019 07:17  Mg     2.2       25 Jun 2019 07:17                        11.0   6.04  )-----------( 232      ( 23 Jun 2019 07:20 )             35.8   Lipid Profile: 06-22 @ 10:51  HDL/Total Cholesterol: --  HDL Chol:              35 mg/dL  Serum Chol:            118 mg/dL  Direct LDL:            67 mg/dL  Triglycerides:         80 mg/dL    HgA1c: Hemoglobin A1C, Whole Blood: 6.1 % (06-22 @ 10:45)    TSH: Thyroid Stimulating Hormone, Serum: 3.090 uIU/mL (06-25 @ 07:17) Patient is a 78y old  Male who presents with a chief complaint of Shortness of breath, Anxiety (24 Jun 2019 18:09)    PAST MEDICAL & SURGICAL HISTORY:  Hyperlipidemia  Chronic hypoxemic respiratory failure  CAD (coronary atherosclerotic disease)  HTN (hypertension)  CHF (congestive heart failure)  Diabetes  S/P CABG x 3  ICD    INTERVAL HISTORY: Resting in chair in NAD, anxious to go home, denies chest pain, SOB, edema.   	  MEDICATIONS  (STANDING):  atorvastatin 10 milliGRAM(s) Oral at bedtime  dextrose 5%. 1000 milliLiter(s) (50 mL/Hr) IV Continuous <Continuous>  dextrose 50% Injectable 12.5 Gram(s) IV Push once  dextrose 50% Injectable 25 Gram(s) IV Push once  dextrose 50% Injectable 25 Gram(s) IV Push once  insulin lispro (HumaLOG) corrective regimen sliding scale   SubCutaneous three times a day before meals  melatonin 3 milliGRAM(s) Oral at bedtime  metFORMIN 500 milliGRAM(s) Oral two times a day  pantoprazole    Tablet 40 milliGRAM(s) Oral before breakfast  rivaroxaban 15 milliGRAM(s) Oral with dinner  sacubitril 49 mG/valsartan 51 mG 1 Tablet(s) Oral two times a day  ticagrelor 90 milliGRAM(s) Oral every 12 hours    MEDICATIONS  (PRN):  acetaminophen   Tablet .. 650 milliGRAM(s) Oral every 6 hours PRN Mild Pain (1 - 3)  ALPRAZolam 0.25 milliGRAM(s) Oral two times a day PRN Anxiety  dextrose 40% Gel 15 Gram(s) Oral once PRN Blood Glucose LESS THAN 70 milliGRAM(s)/deciliter  glucagon  Injectable 1 milliGRAM(s) IntraMuscular once PRN Glucose LESS THAN 70 milligrams/deciliter  mirtazapine 7.5 milliGRAM(s) Oral at bedtime PRN insomnia  ondansetron Injectable 4 milliGRAM(s) IV Push every 6 hours PRN Nausea and/or Vomiting      Vitals:  T(F): 97.8 (06-25-19 @ 11:07), Max: 98.6 (06-24-19 @ 16:08)  HR: 79 (06-25-19 @ 11:07) (79 - 107)  BP: 109/58 (06-25-19 @ 11:07) (109/58 - 129/61)  RR: 18 (06-25-19 @ 11:07) (18 - 19)  SpO2: 100% (06-25-19 @ 11:07) (97% - 100%)  Wt(kg): 53.2 kg    06-25 @ 07:01  -  06-25 @ 12:06  --------------------------------------------------------  IN:    Oral Fluid: 200 mL  Total IN: 200 mL    OUT:  Total OUT: 0 mL    Total NET: 200 mL    PHYSICAL EXAM:  Neuro: Awake, responsive  CV: S1 S2 RRR  Lungs: CTABL  GI: Soft, BS +, ND, NT  Extremities: No edema    TELEMETRY: RSR    RADIOLOGY: < from: Xray Chest 1 View-PORTABLE IMMEDIATE (06.21.19 @ 10:49) >  IMPRESSION:There are bilateral diffuse patchy airspace opacities which   may represent pulmonary edema or multifocal pneumonia. The patient is   status poststernotomy and CABG. There is a dual-lead left-sided cardiac   pacing device in place.    < end of copied text >    DIAGNOSTIC TESTING:    [x ] Echocardiogram: < from: TTE Echo Doppler w/o Cont (06.23.19 @ 08:44) >   1. Left ventricular ejection fraction, by visual estimation, is 30 to   35%.   2. Technically limited study.   3. Normal global left ventricular systolic function.   4. Basal and mid anterior septum, basal inferoseptal segment, and basal   anterior segment are abnormal as described above.   5. Elevated left ventricular end-diastolic pressure.   6. Normal left ventricular internal cavity size.   7. Spectral Doppler shows pseudonormal pattern of left ventricular   myocardial filling (Grade II diastolic dysfunction).   8. Normal right ventricular size and function.   9. There is no evidence of pericardialeffusion.  10. Mild thickening and calcification of the anterior and posterior   mitral valve leaflets.  11. Mild to moderate mitral valve regurgitation.  12. Estimated pulmonary artery systolic pressure is 38.6 mmHg assuming a   right atrial pressure of 5 mmHg, which is consistent with borderline   pulmonary hypertension.    < end of copied text >    LABS:	 	  25 Jun 2019 07:17    136    |  105    |  24     ----------------------------<  99     3.9     |  24     |  0.72   23 Jun 2019 07:20    135    |  102    |  30     ----------------------------<  100    4.8     |  23     |  1.31     Ca    8.4        25 Jun 2019 07:17  Mg     2.2       25 Jun 2019 07:17                        11.0   6.04  )-----------( 232      ( 23 Jun 2019 07:20 )             35.8   Lipid Profile: 06-22 @ 10:51  HDL/Total Cholesterol: --  HDL Chol:              35 mg/dL  Serum Chol:            118 mg/dL  Direct LDL:            67 mg/dL  Triglycerides:         80 mg/dL    HgA1c: Hemoglobin A1C, Whole Blood: 6.1 % (06-22 @ 10:45)    TSH: Thyroid Stimulating Hormone, Serum: 3.090 uIU/mL (06-25 @ 07:17)

## 2019-06-26 ENCOUNTER — INBOUND DOCUMENT (OUTPATIENT)
Age: 79
End: 2019-06-26

## 2019-06-27 PROBLEM — I25.10 ATHEROSCLEROTIC HEART DISEASE OF NATIVE CORONARY ARTERY WITHOUT ANGINA PECTORIS: Chronic | Status: ACTIVE | Noted: 2019-06-21

## 2019-06-27 PROBLEM — J96.11 CHRONIC RESPIRATORY FAILURE WITH HYPOXIA: Chronic | Status: ACTIVE | Noted: 2019-06-21

## 2019-06-27 PROBLEM — E11.9 TYPE 2 DIABETES MELLITUS WITHOUT COMPLICATIONS: Chronic | Status: ACTIVE | Noted: 2019-06-21

## 2019-06-27 PROBLEM — I10 ESSENTIAL (PRIMARY) HYPERTENSION: Chronic | Status: ACTIVE | Noted: 2019-06-21

## 2019-06-27 PROBLEM — E78.5 HYPERLIPIDEMIA, UNSPECIFIED: Chronic | Status: ACTIVE | Noted: 2019-06-21

## 2019-06-27 PROBLEM — I50.9 HEART FAILURE, UNSPECIFIED: Chronic | Status: ACTIVE | Noted: 2019-06-21

## 2019-06-28 RX ORDER — OLANZAPINE 15 MG/1
1 TABLET, FILM COATED ORAL
Qty: 30 | Refills: 0
Start: 2019-06-28 | End: 2019-07-27

## 2019-06-28 NOTE — CHART NOTE - NSCHARTNOTEFT_GEN_A_CORE
Patient’s daughter left message on CL office this morning stating that A pharmacy said they needed “authorization” for the Zyprexa and they could not  the prescription as a result. Writer called [facility] at [phone] and talked with the Pharmacist who said Patient’s insurance does not cover 2.5mg tabs but only 5mg. (unclear the reasoning). The original RX was thus discontinued and Writer had to resubmit a new prescription as per pharmacist’s request for “olanzapine 5mg PO qd PRN for agitation, cut in half and give 0.5 tab up to 2x/day if who tab makes Patient too sedated; dispense 30; no refills.”

## 2019-07-01 DIAGNOSIS — I48.2 CHRONIC ATRIAL FIBRILLATION: ICD-10-CM

## 2019-07-01 DIAGNOSIS — F41.9 ANXIETY DISORDER, UNSPECIFIED: ICD-10-CM

## 2019-07-01 DIAGNOSIS — I11.0 HYPERTENSIVE HEART DISEASE WITH HEART FAILURE: ICD-10-CM

## 2019-07-01 DIAGNOSIS — E11.9 TYPE 2 DIABETES MELLITUS WITHOUT COMPLICATIONS: ICD-10-CM

## 2019-07-01 DIAGNOSIS — I50.43 ACUTE ON CHRONIC COMBINED SYSTOLIC (CONGESTIVE) AND DIASTOLIC (CONGESTIVE) HEART FAILURE: ICD-10-CM

## 2019-07-01 DIAGNOSIS — Z79.899 OTHER LONG TERM (CURRENT) DRUG THERAPY: ICD-10-CM

## 2019-07-01 DIAGNOSIS — E43 UNSPECIFIED SEVERE PROTEIN-CALORIE MALNUTRITION: ICD-10-CM

## 2019-07-01 DIAGNOSIS — Z95.810 PRESENCE OF AUTOMATIC (IMPLANTABLE) CARDIAC DEFIBRILLATOR: ICD-10-CM

## 2019-07-01 DIAGNOSIS — I34.0 NONRHEUMATIC MITRAL (VALVE) INSUFFICIENCY: ICD-10-CM

## 2019-07-01 DIAGNOSIS — Z95.1 PRESENCE OF AORTOCORONARY BYPASS GRAFT: ICD-10-CM

## 2019-07-01 DIAGNOSIS — Z99.81 DEPENDENCE ON SUPPLEMENTAL OXYGEN: ICD-10-CM

## 2019-07-01 DIAGNOSIS — E78.5 HYPERLIPIDEMIA, UNSPECIFIED: ICD-10-CM

## 2019-07-01 DIAGNOSIS — Z79.84 LONG TERM (CURRENT) USE OF ORAL HYPOGLYCEMIC DRUGS: ICD-10-CM

## 2019-07-01 DIAGNOSIS — I95.9 HYPOTENSION, UNSPECIFIED: ICD-10-CM

## 2019-07-01 DIAGNOSIS — J96.21 ACUTE AND CHRONIC RESPIRATORY FAILURE WITH HYPOXIA: ICD-10-CM

## 2019-07-01 DIAGNOSIS — I25.10 ATHEROSCLEROTIC HEART DISEASE OF NATIVE CORONARY ARTERY WITHOUT ANGINA PECTORIS: ICD-10-CM

## 2019-07-01 DIAGNOSIS — Z79.01 LONG TERM (CURRENT) USE OF ANTICOAGULANTS: ICD-10-CM

## 2019-07-02 ENCOUNTER — APPOINTMENT (OUTPATIENT)
Dept: CARDIOLOGY | Facility: CLINIC | Age: 79
End: 2019-07-02

## 2022-02-17 NOTE — BEHAVIORAL HEALTH ASSESSMENT NOTE - THOUGHT ASSOCIATIONS
Free hand note from the writer of code blue:    1:39pm patient brought back to ER 14. CODE BLUE CALLED CPR INITIATED  1:39 pm vfib on monitor. Shock 200 J x2  1:41 pm vfib on monitor shock 200J x2  1:42pm 1 mg epi  1:43 pm vfib on monitor pulseless shock 360 J  1:45 pm calcium chloride 100 mg  ETT PLACED 8.0 24 @ teeth  1:46 pm 1 mg epi  1:47 pm 150 mg amio push  1:48 pm- 360 J shock pulseless vfib  1:49 pm-150 mg amio push  1:52pm 2 mg versed push  1:53 pm-pulse check- DUAL SHOCK 360 J each  1:54 pm- Vec 10 mg push  1:55pm- 1 mg Epi  1:57 pm- vfib on monitor DUAL SHOCK 360 J  1:58pm- 1 mg epi  2 pm-PEA on monitor CPR resumed  2:02 pm-pulse check, weak carotid pulse found, then pulse lost, CPR resumed  2:03 pm- 1 mg epi  2:05pm- pulse check, PEA CPR resumed  2:06 pm- CPR stopped, pulse check, femoral pulse found  2:09-strong femoral pulse. NSR on monitor  ORDER FOR EPI DRIP OBTAINED  2:13 pm- 20 mcg epi given while waiting for epi drip  2:14 pm- 20 mcg epi given     Normal
